# Patient Record
Sex: FEMALE | Race: WHITE | NOT HISPANIC OR LATINO | Employment: UNEMPLOYED | URBAN - METROPOLITAN AREA
[De-identification: names, ages, dates, MRNs, and addresses within clinical notes are randomized per-mention and may not be internally consistent; named-entity substitution may affect disease eponyms.]

---

## 2017-04-21 ENCOUNTER — GENERIC CONVERSION - ENCOUNTER (OUTPATIENT)
Dept: OTHER | Facility: OTHER | Age: 42
End: 2017-04-21

## 2017-09-08 ENCOUNTER — ALLSCRIPTS OFFICE VISIT (OUTPATIENT)
Dept: OTHER | Facility: OTHER | Age: 42
End: 2017-09-08

## 2018-01-12 VITALS
HEART RATE: 60 BPM | BODY MASS INDEX: 22.53 KG/M2 | SYSTOLIC BLOOD PRESSURE: 110 MMHG | WEIGHT: 132 LBS | DIASTOLIC BLOOD PRESSURE: 80 MMHG | HEIGHT: 64 IN | TEMPERATURE: 97.9 F | OXYGEN SATURATION: 99 %

## 2018-02-16 DIAGNOSIS — R21 RASH: Primary | ICD-10-CM

## 2018-02-16 RX ORDER — MOMETASONE FUROATE 1 MG/G
CREAM TOPICAL DAILY
Qty: 45 G | Refills: 0 | Status: SHIPPED | OUTPATIENT
Start: 2018-02-16 | End: 2019-07-31 | Stop reason: SDUPTHER

## 2018-03-27 ENCOUNTER — OFFICE VISIT (OUTPATIENT)
Dept: INTERNAL MEDICINE CLINIC | Facility: CLINIC | Age: 43
End: 2018-03-27

## 2018-03-27 VITALS
WEIGHT: 134 LBS | BODY MASS INDEX: 22.88 KG/M2 | HEART RATE: 72 BPM | DIASTOLIC BLOOD PRESSURE: 72 MMHG | SYSTOLIC BLOOD PRESSURE: 118 MMHG | RESPIRATION RATE: 12 BRPM | HEIGHT: 64 IN

## 2018-03-27 DIAGNOSIS — L98.9 SKIN ABNORMALITY: Primary | ICD-10-CM

## 2018-03-27 DIAGNOSIS — R10.12 LEFT UPPER QUADRANT PAIN: ICD-10-CM

## 2018-03-27 PROCEDURE — 99213 OFFICE O/P EST LOW 20 MIN: CPT | Performed by: INTERNAL MEDICINE

## 2018-03-27 RX ORDER — CLONAZEPAM 0.5 MG/1
1 TABLET ORAL
COMMUNITY
Start: 2016-03-15 | End: 2019-07-31

## 2018-03-27 RX ORDER — OMEPRAZOLE 40 MG/1
40 CAPSULE, DELAYED RELEASE ORAL DAILY
Qty: 30 CAPSULE | Refills: 1 | Status: SHIPPED | OUTPATIENT
Start: 2018-03-27 | End: 2019-07-31

## 2018-03-27 NOTE — ASSESSMENT & PLAN NOTE
Likely a cyst which has been intermittently infected   Will refer to Dr Melissa Nogueira for possible bx and/or excision

## 2018-03-27 NOTE — PROGRESS NOTES
Assessment/Plan:    Skin abnormality  Likely a cyst which has been intermittently infected  Will refer to Dr Ruthy Clark for possible bx and/or excision       Diagnoses and all orders for this visit:    Skin abnormality  -     Ambulatory referral to Dermatology; Future    Left upper quadrant pain  -     Comprehensive metabolic panel; Future  -     CBC and differential; Future  -     US abdomen complete; Future  -     omeprazole (PriLOSEC) 40 MG capsule; Take 1 capsule (40 mg total) by mouth daily    Other orders  -     clonazePAM (KlonoPIN) 0 5 mg tablet; Take 1 tablet by mouth          Subjective:      Patient ID: Rahel Muñoz is a 38 y o  female  Patient presents with complaints of left sided upper abdominal pain  It started 3 days ago, its sharp and lasts a few seconds  Its a 6-7/10 at its worst  Worse with eating but occurs at night in her sleep and wakes her up  It radiates along her ribs to the flank  She has had no urinary symptoms or bleeding  No nausea, vomiting diarrhea or constipation  She has a growth on the right side of her forehead which has been there for 4-6 mths  She had seen the pa who gave her clindamycin and warm compresses but it never went away  It has gotten bigger over time  The following portions of the patient's history were reviewed and updated as appropriate: allergies, current medications, past family history, past medical history, past social history, past surgical history and problem list     Review of Systems   Constitutional: Negative for activity change, appetite change, chills, diaphoresis, fatigue, fever and unexpected weight change  HENT: Negative for congestion, dental problem, drooling, ear discharge, ear pain, facial swelling, hearing loss, mouth sores, nosebleeds, postnasal drip, rhinorrhea, sinus pain, sinus pressure, sneezing, sore throat, tinnitus, trouble swallowing and voice change      Eyes: Negative for photophobia, pain, discharge, redness, itching and visual disturbance  Respiratory: Negative for apnea, cough, choking, chest tightness, shortness of breath, wheezing and stridor  Cardiovascular: Negative for chest pain, palpitations and leg swelling  Gastrointestinal: Positive for abdominal pain  Negative for abdominal distention, anal bleeding, blood in stool, constipation, diarrhea, nausea, rectal pain and vomiting  Endocrine: Negative for cold intolerance, heat intolerance, polydipsia, polyphagia and polyuria  Genitourinary: Negative for decreased urine volume, difficulty urinating, dysuria, enuresis, flank pain, frequency, genital sores, hematuria and urgency  Musculoskeletal: Negative for arthralgias, back pain, gait problem, joint swelling, myalgias, neck pain and neck stiffness  Skin: Negative for color change, pallor, rash and wound  Growth right forehead   Allergic/Immunologic: Negative for environmental allergies, food allergies and immunocompromised state  Neurological: Negative for dizziness, tremors, seizures, syncope, facial asymmetry, speech difficulty, weakness, light-headedness, numbness and headaches  Hematological: Negative for adenopathy  Does not bruise/bleed easily  Psychiatric/Behavioral: Negative for agitation, self-injury, sleep disturbance and suicidal ideas  The patient is not nervous/anxious  Objective:      /72 (BP Location: Left arm, Patient Position: Sitting)   Pulse 72   Resp 12   Ht 5' 4" (1 626 m)   Wt 60 8 kg (134 lb)   BMI 23 00 kg/m²          Physical Exam   Constitutional: She is oriented to person, place, and time  She appears well-developed and well-nourished  No distress  HENT:   Head: Normocephalic and atraumatic  Right Ear: External ear normal    Left Ear: External ear normal    Mouth/Throat: Oropharynx is clear and moist    Eyes: Conjunctivae and EOM are normal  Pupils are equal, round, and reactive to light  No scleral icterus  Neck: Normal range of motion   Neck supple  No JVD present  No tracheal deviation present  No thyromegaly present  Cardiovascular: Normal rate, regular rhythm and intact distal pulses  Exam reveals no gallop and no friction rub  No murmur heard  Pulmonary/Chest: Effort normal and breath sounds normal  No respiratory distress  She has no wheezes  She has no rales  She exhibits no tenderness  Abdominal: Soft  Bowel sounds are normal  She exhibits no distension and no mass  There is tenderness  There is no rebound and no guarding  Tenderness in the left upper quadrant without rebound tenderness   Musculoskeletal: Normal range of motion  She exhibits no edema, tenderness or deformity  Lymphadenopathy:     She has no cervical adenopathy  Neurological: She is alert and oriented to person, place, and time  She has normal reflexes  No cranial nerve deficit  Coordination normal    Skin: Skin is warm and dry  No rash noted  She is not diaphoretic  No erythema  No pallor  Psychiatric: She has a normal mood and affect   Her behavior is normal  Judgment and thought content normal

## 2018-03-28 ENCOUNTER — APPOINTMENT (OUTPATIENT)
Dept: LAB | Facility: HOSPITAL | Age: 43
End: 2018-03-28
Attending: INTERNAL MEDICINE
Payer: COMMERCIAL

## 2018-03-28 ENCOUNTER — HOSPITAL ENCOUNTER (OUTPATIENT)
Dept: ULTRASOUND IMAGING | Facility: HOSPITAL | Age: 43
Discharge: HOME | End: 2018-03-28
Attending: INTERNAL MEDICINE
Payer: COMMERCIAL

## 2018-03-28 DIAGNOSIS — R10.12 LEFT UPPER QUADRANT PAIN: ICD-10-CM

## 2018-03-28 LAB
ALBUMIN SERPL BCP-MCNC: 4 G/DL (ref 3.5–5)
ALP SERPL-CCNC: 53 U/L (ref 46–116)
ALT SERPL W P-5'-P-CCNC: 33 U/L (ref 12–78)
ANION GAP SERPL CALCULATED.3IONS-SCNC: 7 MMOL/L (ref 4–13)
AST SERPL W P-5'-P-CCNC: 20 U/L (ref 5–45)
BASOPHILS # BLD AUTO: 0.03 THOUSANDS/ΜL (ref 0–0.1)
BASOPHILS NFR BLD AUTO: 1 % (ref 0–1)
BILIRUB SERPL-MCNC: 1.1 MG/DL (ref 0.2–1)
BUN SERPL-MCNC: 17 MG/DL (ref 5–25)
CALCIUM SERPL-MCNC: 8.8 MG/DL (ref 8.3–10.1)
CHLORIDE SERPL-SCNC: 105 MMOL/L (ref 100–108)
CO2 SERPL-SCNC: 27 MMOL/L (ref 21–32)
CREAT SERPL-MCNC: 0.76 MG/DL (ref 0.6–1.3)
EOSINOPHIL # BLD AUTO: 0.12 THOUSAND/ΜL (ref 0–0.61)
EOSINOPHIL NFR BLD AUTO: 4 % (ref 0–6)
ERYTHROCYTE [DISTWIDTH] IN BLOOD BY AUTOMATED COUNT: 12.5 % (ref 11.6–15.1)
GFR SERPL CREATININE-BSD FRML MDRD: 98 ML/MIN/1.73SQ M
GLUCOSE P FAST SERPL-MCNC: 85 MG/DL (ref 65–99)
HCT VFR BLD AUTO: 40 % (ref 34.8–46.1)
HGB BLD-MCNC: 13.4 G/DL (ref 11.5–15.4)
LYMPHOCYTES # BLD AUTO: 1.1 THOUSANDS/ΜL (ref 0.6–4.47)
LYMPHOCYTES NFR BLD AUTO: 35 % (ref 14–44)
MCH RBC QN AUTO: 30.8 PG (ref 26.8–34.3)
MCHC RBC AUTO-ENTMCNC: 33.5 G/DL (ref 31.4–37.4)
MCV RBC AUTO: 92 FL (ref 82–98)
MONOCYTES # BLD AUTO: 0.35 THOUSAND/ΜL (ref 0.17–1.22)
MONOCYTES NFR BLD AUTO: 11 % (ref 4–12)
NEUTROPHILS # BLD AUTO: 1.58 THOUSANDS/ΜL (ref 1.85–7.62)
NEUTS SEG NFR BLD AUTO: 50 % (ref 43–75)
NRBC BLD AUTO-RTO: 0 /100 WBCS
PLATELET # BLD AUTO: 152 THOUSANDS/UL (ref 149–390)
PMV BLD AUTO: 9.5 FL (ref 8.9–12.7)
POTASSIUM SERPL-SCNC: 4.2 MMOL/L (ref 3.5–5.3)
PROT SERPL-MCNC: 6.9 G/DL (ref 6.4–8.2)
RBC # BLD AUTO: 4.35 MILLION/UL (ref 3.81–5.12)
SODIUM SERPL-SCNC: 139 MMOL/L (ref 136–145)
WBC # BLD AUTO: 3.19 THOUSAND/UL (ref 4.31–10.16)

## 2018-03-28 PROCEDURE — 80053 COMPREHEN METABOLIC PANEL: CPT

## 2018-03-28 PROCEDURE — 36415 COLL VENOUS BLD VENIPUNCTURE: CPT

## 2018-03-28 PROCEDURE — 76700 US EXAM ABDOM COMPLETE: CPT

## 2018-03-28 PROCEDURE — 85025 COMPLETE CBC W/AUTO DIFF WBC: CPT

## 2018-04-03 ENCOUNTER — TELEPHONE (OUTPATIENT)
Dept: INTERNAL MEDICINE CLINIC | Facility: CLINIC | Age: 43
End: 2018-04-03

## 2018-04-03 NOTE — TELEPHONE ENCOUNTER
Dr Cathy Aguirre pt    Pt can be reached at work p# also- that p# is: [phone]  Pt would like results of her ultra sound that was done last week, Wed 3/28- plus lab work results    Ultra sound and lab work were done at [facility]    I see the results are in the pt's chart      Pls call the pt

## 2018-04-18 DIAGNOSIS — F41.9 ANXIETY: Primary | ICD-10-CM

## 2018-04-18 RX ORDER — ESCITALOPRAM OXALATE 10 MG/1
TABLET ORAL
Qty: 30 TABLET | Refills: 5 | Status: SHIPPED | OUTPATIENT
Start: 2018-04-18 | End: 2018-11-18 | Stop reason: SDUPTHER

## 2018-11-18 DIAGNOSIS — F41.9 ANXIETY: ICD-10-CM

## 2018-11-19 RX ORDER — ESCITALOPRAM OXALATE 10 MG/1
TABLET ORAL
Qty: 30 TABLET | Refills: 5 | Status: SHIPPED | OUTPATIENT
Start: 2018-11-19 | End: 2019-06-14 | Stop reason: SDUPTHER

## 2019-06-07 ENCOUNTER — TELEPHONE (OUTPATIENT)
Dept: INTERNAL MEDICINE CLINIC | Facility: CLINIC | Age: 44
End: 2019-06-07

## 2019-06-13 DIAGNOSIS — F41.9 ANXIETY: ICD-10-CM

## 2019-06-13 RX ORDER — ESCITALOPRAM OXALATE 10 MG/1
TABLET ORAL
Qty: 30 TABLET | Refills: 5 | OUTPATIENT
Start: 2019-06-13

## 2019-06-13 NOTE — TELEPHONE ENCOUNTER
Called and spoke with patient  She made an appointment to establish with Dr Jason Alfred on July 31st  She asked if it would be possible to get a supply to hold her over till the appointment- a month and a half supply? She's been meaning to get an appointment but she just got  and it's "been a whirlwind"  She's excited to see Dr Jason Alfred since her mom sees her and really likes her  Please call the patient either way  She will come in and see a PA if she needs to for a refill if needed       Pharmacy:[facility]    Patient's #[phone]

## 2019-06-14 DIAGNOSIS — F41.9 ANXIETY: ICD-10-CM

## 2019-06-14 RX ORDER — ESCITALOPRAM OXALATE 10 MG/1
10 TABLET ORAL EVERY MORNING
Qty: 90 TABLET | Refills: 2 | Status: SHIPPED | OUTPATIENT
Start: 2019-06-14 | End: 2020-04-05

## 2019-07-31 ENCOUNTER — OFFICE VISIT (OUTPATIENT)
Dept: INTERNAL MEDICINE CLINIC | Facility: CLINIC | Age: 44
End: 2019-07-31
Payer: COMMERCIAL

## 2019-07-31 VITALS
OXYGEN SATURATION: 98 % | SYSTOLIC BLOOD PRESSURE: 112 MMHG | DIASTOLIC BLOOD PRESSURE: 74 MMHG | BODY MASS INDEX: 25.52 KG/M2 | HEART RATE: 64 BPM | HEIGHT: 63 IN | WEIGHT: 144 LBS

## 2019-07-31 DIAGNOSIS — G56.03 CARPAL TUNNEL SYNDROME, BILATERAL: ICD-10-CM

## 2019-07-31 DIAGNOSIS — R53.81 MALAISE AND FATIGUE: ICD-10-CM

## 2019-07-31 DIAGNOSIS — E66.3 OVERWEIGHT (BMI 25.0-29.9): ICD-10-CM

## 2019-07-31 DIAGNOSIS — L20.9 ATOPIC DERMATITIS, UNSPECIFIED TYPE: ICD-10-CM

## 2019-07-31 DIAGNOSIS — R53.83 MALAISE AND FATIGUE: ICD-10-CM

## 2019-07-31 DIAGNOSIS — Z13.6 SCREENING FOR CARDIOVASCULAR CONDITION: ICD-10-CM

## 2019-07-31 DIAGNOSIS — F41.9 ANXIETY: Primary | ICD-10-CM

## 2019-07-31 PROBLEM — R10.12 LEFT UPPER QUADRANT PAIN: Status: RESOLVED | Noted: 2018-03-27 | Resolved: 2019-07-31

## 2019-07-31 PROBLEM — K21.9 GASTROESOPHAGEAL REFLUX DISEASE WITHOUT ESOPHAGITIS: Status: ACTIVE | Noted: 2019-07-31

## 2019-07-31 PROCEDURE — 99214 OFFICE O/P EST MOD 30 MIN: CPT | Performed by: INTERNAL MEDICINE

## 2019-07-31 PROCEDURE — 3008F BODY MASS INDEX DOCD: CPT | Performed by: INTERNAL MEDICINE

## 2019-07-31 PROCEDURE — 1036F TOBACCO NON-USER: CPT | Performed by: INTERNAL MEDICINE

## 2019-07-31 RX ORDER — MOMETASONE FUROATE 1 MG/G
CREAM TOPICAL DAILY
Qty: 45 G | Refills: 2 | Status: SHIPPED | OUTPATIENT
Start: 2019-07-31 | End: 2021-04-19

## 2019-07-31 NOTE — PROGRESS NOTES
INTERNAL MEDICINE OFFICE VISIT  [facility] AT [facility] [place]  [address], [address] [facility]  Tel: [phone]      NAME: Nikko Glasgow  AGE: 41 y o  SEX: female  : 1975   MRN: [id number]    DATE: 2019  TIME: 7:12 PM      Assessment and Plan:  1  Anxiety    Continue Lexapro  - TSH, 3rd generation; Future    2  Atopic dermatitis, unspecified type    - mometasone (ELOCON) 0 1 % cream; Apply topically daily  Dispense: 45 g; Refill: 2    3  Carpal tunnel syndrome, bilateral   she was told to use the wrist splint at night and take ibuprofen as needed  4  Malaise and fatigue    I will get back to her with the results of the blood work  - CBC and differential; Future  - Comprehensive metabolic panel; Future    5  Overweight (BMI 25 0-29  9)  BMI Counseling: Body mass index is 25 51 kg/m²  Discussed the patient's BMI with her  The BMI is above average  BMI counseling and education was provided to the patient  Nutrition recommendations include reducing portion sizes, decreasing overall calorie intake and moderation in carbohydrate intake  6  Screening for cardiovascular condition    - Lipid panel; Future      - Counseling Documentation: patient was counseled regarding: diagnostic results, instructions for management, risk factor reductions, prognosis, patient and family education, impressions, risks and benefits of treatment options and importance of compliance with treatment  - Medication Side Effects: Adverse side effects of medications were reviewed with the patient/guardian today  Return for follow up visit in  1 year or earlier, if needed  Chief Complaint:  Chief Complaint   Patient presents with   [facility]      former [facility] pt  History of Present Illness:    this is Dr Eunice Mcnair patient was here to establish  Anxiety -her symptoms are much better controlled after she was started on Lexapro      Eczema - she has been having a rash on her face which is helped by the mometasone cream     Carpal tunnel syndrome -she has symptoms of pain and numbness in both her hands which is getting worse  Overweight - she was made aware that she needs to lose weight      Active Problem List:  Patient Active Problem List   Diagnosis    Skin abnormality    Anxiety    Atopic dermatitis    Hyperbilirubinemia    Joint pain, hip    Knee pain    Malaise and fatigue    Carpal tunnel syndrome, bilateral    Overweight (BMI 25 0-29  9)    Gastroesophageal reflux disease without esophagitis         Past Medical History:  Past Medical History:   Diagnosis Date    Anxiety          Past Surgical History:  Past Surgical History:   Procedure Laterality Date     SECTION      LAST ASSESSED: [id number]    CYST REMOVAL      HAND EXCISION OF TENDON CYST LAST ASSESSED: [id number]         Family History:  Family History   Problem Relation Age of Onset   Hue Ambar COPD Mother     Hypertension Mother     Diabetes Mother     Kidney cancer Mother    Hue Ambar Breast cancer Mother     Heart attack Mother     Osteoarthritis Mother         GENERALIZED    Hypertension Father         BENIGN ESSENTIAL    COPD Father     Diabetes Father     Kidney cancer Father     Breast cancer Father     Prostate cancer Father     Heart attack Father     Osteoarthritis Father         GENERALIZED    Kidney cancer Maternal Grandfather     Breast cancer Maternal Grandfather     Prostate cancer Maternal Grandfather     Heart attack Maternal Grandfather     Stroke Family         CEREBRAL INFARCTION CVA    Diabetes Paternal Aunt     Kidney cancer Paternal [de-identified]     Breast cancer Paternal Aunt     Heart attack Paternal Aunt     Kidney cancer Other     Breast cancer Other     Prostate cancer Other     Heart attack Other     Kidney cancer Maternal Aunt     Breast cancer Maternal Aunt     Heart attack Maternal Aunt     Kidney cancer Paternal Uncle     Breast cancer Paternal Uncle     Kidney cancer Maternal Uncle     Prostate cancer Maternal Uncle          Social History:  Social History     Socioeconomic History    Marital status: /Civil Union     Spouse name: None    Number of children: None    Years of education: None    Highest education level: None   Occupational History     Comment: FULL-TIME EMPLOYMENT   Social Needs    Financial resource strain: None    Food insecurity:     Worry: None     Inability: None    Transportation needs:     Medical: None     Non-medical: None   Tobacco Use    Smoking status: Never Smoker    Smokeless tobacco: Never Used    Tobacco comment: FORMER SMOKER AS PER [employer]   Substance and Sexual Activity    Alcohol use: Yes     Comment: OCCASIONAL    Drug use: No    Sexual activity: Yes   Lifestyle    Physical activity:     Days per week: 5 days     Minutes per session: 90 min    Stress: Only a little   Relationships    Social connections:     Talks on phone: None     Gets together: None     Attends Hindu service: None     Active member of club or organization: None     Attends meetings of clubs or organizations: None     Relationship status: None    Intimate partner violence:     Fear of current or ex partner: None     Emotionally abused: None     Physically abused: None     Forced sexual activity: None   Other Topics Concern    None   Social History Narrative    None         Allergies:   Allergies   Allergen Reactions    Morphine          Medications:    Current Outpatient Medications:     escitalopram (LEXAPRO) 10 mg tablet, Take 1 tablet (10 mg total) by mouth every morning, Disp: 90 tablet, Rfl: 2    mometasone (ELOCON) 0 1 % cream, Apply topically daily, Disp: 45 g, Rfl: 2      The following portions of the patient's history were reviewed and updated as appropriate: past medical history, past surgical history, family history, social history, allergies, current medications and active problem list       Review of Systems:  Constitutional: Denies fever, chills, weight gain, weight loss,  Has fatigue  Eyes: Denies eye redness, eye discharge, double vision, change in visual acuity  ENT: Denies hearing loss, tinnitus, sneezing, nasal congestion, nasal discharge, sore throat   Respiratory: Denies cough, expectoration, hemoptysis, shortness of breath, wheezing  Cardiovascular: Denies chest pain, palpitations, lower extremity swelling, orthopnea, PND  Gastrointestinal: Denies abdominal pain, heartburn, nausea, vomiting, hematemesis, diarrhea, bloody stools  Genito-Urinary: Denies dysuria, frequency, difficulty in micturition, nocturia, incontinence  Musculoskeletal: Denies back pain, joint pain, muscle pain  Neurologic: Denies confusion, lightheadedness, syncope, headache, focal weakness, sensory changes, seizures  Endocrine: Denies polyuria, polydipsia, temperature intolerance  Allergy and Immunology: Denies hives, insect bite sensitivity  Hematological and Lymphatic: Denies bleeding problems, swollen glands   Psychological: Denies depression, suicidal ideation, anxiety, panic, mood swings  Dermatological: Denies pruritus, rash, skin lesion changes      Vitals:  Vitals:    07/31/19 1806   BP: 112/74   Pulse: 64   SpO2: 98%       Body mass index is 25 51 kg/m²  Weight (last 2 days)     Date/Time   Weight    07/31/19 1806   65 3 (144)                Physical Examination:  General: Patient is not in acute distress  Awake, alert, responding to commands  No weight gain or loss  Head: Normocephalic  Atraumatic  Eyes: Conjunctiva and lids with no swelling, erythema or discharge  Both pupils normal sized, round and reactive to light  Sclera nonicteric  ENT: External examination of nose and ear normal  Otoscopic examination shows translucent tympanic membranes with patent canals without erythema  Oropharynx moist with no erythema, edema, exudate or lesions  Neck: Supple  JVP not raised  Trachea midline  No masses   No thyromegaly  Lungs: No signs of increased work of breathing or respiratory distress  Bilateral bronchovascular breath sounds with no crackles or rhonchi  Chest wall: No tenderness  Cardiovascular: Normal PMI  No thrills  Regular rate and rhythm  S1 and S2 normal  No murmur, rub or gallop  Gastrointestinal: Abdomen soft, nontender  No guarding or rigidity  Liver and spleen not palpable  Bowel sounds present  Neurologic: Cranial nerves II-XII intact  Cortical functions normal  Motor system - Reflexes 2+ and symmetrical  Sensations normal  Musculoskeletal: Gait normal  No joint tenderness  Integumentary: cyst on right forehead  Lymphatic: No palpable lymph nodes in neck, axilla or groin  Extremities: No clubbing, cyanosis, edema or varicosities  Psychological: Judgement and insight normal  Mood and affect normal      Laboratory Results:  CBC with diff:   Lab Results   Component Value Date    WBC 3 19 (L) 03/28/2018    RBC 4 35 03/28/2018    HGB 13 4 03/28/2018    HCT 40 0 03/28/2018    MCV 92 03/28/2018    MCH 30 8 03/28/2018    RDW 12 5 03/28/2018     03/28/2018       CMP:  Lab Results   Component Value Date    CREATININE 0 76 03/28/2018    BUN 17 03/28/2018    K 4 2 03/28/2018     03/28/2018    CO2 27 03/28/2018    ALKPHOS 53 03/28/2018    ALT 33 03/28/2018    AST 20 03/28/2018       No results found for: HGBA1C, MG, PHOS    No results found for: TROPONINI, CKMB, CKTOTAL    Lipid Profile:   No results found for: CHOL  No results found for: HDL  No results found for: LDLCALC  No results found for: TRIG    Imaging Results:  US abdomen complete  Narrative: ABDOMEN ULTRASOUND, COMPLETE     INDICATION:   R10 12: Left upper quadrant pain  COMPARISON:  Portable quadrant ultrasound dated May 22, 2015  TECHNIQUE:   Real-time ultrasound of the abdomen was performed with a curvilinear transducer with both volumetric sweeps and still imaging techniques      FINDINGS:    PANCREAS:  Visualized portions of the pancreas are within normal limits  AORTA AND IVC:  Visualized portions are normal for patient age  LIVER:  Size:  Within normal range  The liver measures 14 8 cm in the midclavicular line  Contour:  Surface contour is smooth  Parenchyma:  Echogenicity and echotexture are within normal limits  No evidence of suspicious mass  Limited imaging of the main portal vein shows it to be patent and hepatopetal     BILIARY:  The gallbladder is normal in caliber  No wall thickening or pericholecystic fluid  No stones or sludge identified  No sonographic Higuera's sign  No intrahepatic biliary dilatation  CBD measures 2 mm  No choledocholithiasis  KIDNEY:   Right kidney measures 10 0 x 5 5 cm  Within normal limits  Left kidney measures 12 5 x 3 3 cm  Within normal limits  SPLEEN:   Measures 13 4 x 13 2 x 5 5 cm  Within normal limits  ASCITES:  None  Impression: No acute intra-abdominal abnormality  No gallstone/sludge, pericholecystic fluid or wall thickening  Mild splenomegaly  No hydronephrosis      Workstation performed: [id number]       Health Maintenance:  Health Maintenance   Topic Date Due    MAMMOGRAM  1975    BMI: Followup Plan  11/07/1995    BMI: Adult  11/07/1995    DTaP,Tdap,and Td Vaccines (1 - Tdap) 11/07/1998    Depression Screening PHQ  03/27/2019    INFLUENZA VACCINE  07/01/2019    PAP SMEAR  04/07/2020    Pneumococcal Vaccine: 65+ Years (1 of 2 - PCV13) 11/07/2042    Pneumococcal Vaccine: Pediatrics (0 to 5 Years) and At-Risk Patients (6 to 59 Years)  Aged Out    HEPATITIS B VACCINES  Aged [employer] History   Administered Date(s) Administered    Tdap 1975         Serafin Nicolas MD  7/31/2019,7:12 PM

## 2020-01-29 ENCOUNTER — LAB (OUTPATIENT)
Dept: LAB | Facility: CLINIC | Age: 45
End: 2020-01-29
Payer: COMMERCIAL

## 2020-01-29 ENCOUNTER — TELEPHONE (OUTPATIENT)
Dept: INTERNAL MEDICINE CLINIC | Facility: CLINIC | Age: 45
End: 2020-01-29

## 2020-01-29 DIAGNOSIS — R53.81 MALAISE AND FATIGUE: ICD-10-CM

## 2020-01-29 DIAGNOSIS — F41.9 ANXIETY: ICD-10-CM

## 2020-01-29 DIAGNOSIS — Z13.6 SCREENING FOR CARDIOVASCULAR CONDITION: ICD-10-CM

## 2020-01-29 DIAGNOSIS — R53.83 MALAISE AND FATIGUE: ICD-10-CM

## 2020-01-29 LAB
ALBUMIN SERPL BCP-MCNC: 4.2 G/DL (ref 3.5–5)
ALP SERPL-CCNC: 61 U/L (ref 46–116)
ALT SERPL W P-5'-P-CCNC: 26 U/L (ref 12–78)
ANION GAP SERPL CALCULATED.3IONS-SCNC: 5 MMOL/L (ref 4–13)
AST SERPL W P-5'-P-CCNC: 16 U/L (ref 5–45)
BASOPHILS # BLD AUTO: 0.04 THOUSANDS/ΜL (ref 0–0.1)
BASOPHILS NFR BLD AUTO: 1 % (ref 0–1)
BILIRUB SERPL-MCNC: 0.83 MG/DL (ref 0.2–1)
BUN SERPL-MCNC: 18 MG/DL (ref 5–25)
CALCIUM SERPL-MCNC: 9.1 MG/DL (ref 8.3–10.1)
CHLORIDE SERPL-SCNC: 106 MMOL/L (ref 100–108)
CHOLEST SERPL-MCNC: 158 MG/DL (ref 50–200)
CO2 SERPL-SCNC: 26 MMOL/L (ref 21–32)
CREAT SERPL-MCNC: 0.78 MG/DL (ref 0.6–1.3)
EOSINOPHIL # BLD AUTO: 0.18 THOUSAND/ΜL (ref 0–0.61)
EOSINOPHIL NFR BLD AUTO: 3 % (ref 0–6)
ERYTHROCYTE [DISTWIDTH] IN BLOOD BY AUTOMATED COUNT: 12.5 % (ref 11.6–15.1)
GFR SERPL CREATININE-BSD FRML MDRD: 94 ML/MIN/1.73SQ M
GLUCOSE SERPL-MCNC: 82 MG/DL (ref 65–140)
HCT VFR BLD AUTO: 40.3 % (ref 34.8–46.1)
HDLC SERPL-MCNC: 77 MG/DL
HGB BLD-MCNC: 13.6 G/DL (ref 11.5–15.4)
IMM GRANULOCYTES # BLD AUTO: 0.01 THOUSAND/UL (ref 0–0.2)
IMM GRANULOCYTES NFR BLD AUTO: 0 % (ref 0–2)
LDLC SERPL CALC-MCNC: 68 MG/DL (ref 0–100)
LYMPHOCYTES # BLD AUTO: 1.71 THOUSANDS/ΜL (ref 0.6–4.47)
LYMPHOCYTES NFR BLD AUTO: 30 % (ref 14–44)
MCH RBC QN AUTO: 31.1 PG (ref 26.8–34.3)
MCHC RBC AUTO-ENTMCNC: 33.7 G/DL (ref 31.4–37.4)
MCV RBC AUTO: 92 FL (ref 82–98)
MONOCYTES # BLD AUTO: 0.4 THOUSAND/ΜL (ref 0.17–1.22)
MONOCYTES NFR BLD AUTO: 7 % (ref 4–12)
NEUTROPHILS # BLD AUTO: 3.28 THOUSANDS/ΜL (ref 1.85–7.62)
NEUTS SEG NFR BLD AUTO: 59 % (ref 43–75)
NONHDLC SERPL-MCNC: 81 MG/DL
NRBC BLD AUTO-RTO: 0 /100 WBCS
PLATELET # BLD AUTO: 206 THOUSANDS/UL (ref 149–390)
PMV BLD AUTO: 9.7 FL (ref 8.9–12.7)
POTASSIUM SERPL-SCNC: 3.8 MMOL/L (ref 3.5–5.3)
PROT SERPL-MCNC: 7.1 G/DL (ref 6.4–8.2)
RBC # BLD AUTO: 4.38 MILLION/UL (ref 3.81–5.12)
SODIUM SERPL-SCNC: 137 MMOL/L (ref 136–145)
TRIGL SERPL-MCNC: 63 MG/DL
TSH SERPL DL<=0.05 MIU/L-ACNC: 1.72 UIU/ML (ref 0.36–3.74)
WBC # BLD AUTO: 5.62 THOUSAND/UL (ref 4.31–10.16)

## 2020-01-29 PROCEDURE — 36415 COLL VENOUS BLD VENIPUNCTURE: CPT

## 2020-01-29 PROCEDURE — 80053 COMPREHEN METABOLIC PANEL: CPT

## 2020-01-29 PROCEDURE — 85025 COMPLETE CBC W/AUTO DIFF WBC: CPT

## 2020-01-29 PROCEDURE — 80061 LIPID PANEL: CPT

## 2020-01-29 PROCEDURE — 84443 ASSAY THYROID STIM HORMONE: CPT

## 2020-01-29 NOTE — TELEPHONE ENCOUNTER
----- Message from Deedee Arreola MD sent at 1/29/2020  4:33 PM EST -----  Labs ok, please call and inform patient

## 2020-02-11 ENCOUNTER — TELEPHONE (OUTPATIENT)
Dept: ADMINISTRATIVE | Facility: OTHER | Age: 45
End: 2020-02-11

## 2020-02-11 ENCOUNTER — OFFICE VISIT (OUTPATIENT)
Dept: INTERNAL MEDICINE CLINIC | Facility: CLINIC | Age: 45
End: 2020-02-11
Payer: COMMERCIAL

## 2020-02-11 VITALS
SYSTOLIC BLOOD PRESSURE: 110 MMHG | OXYGEN SATURATION: 96 % | TEMPERATURE: 98.5 F | HEIGHT: 63 IN | BODY MASS INDEX: 25.51 KG/M2 | HEART RATE: 66 BPM | DIASTOLIC BLOOD PRESSURE: 82 MMHG

## 2020-02-11 DIAGNOSIS — J01.00 ACUTE NON-RECURRENT MAXILLARY SINUSITIS: Primary | ICD-10-CM

## 2020-02-11 PROCEDURE — 99213 OFFICE O/P EST LOW 20 MIN: CPT | Performed by: INTERNAL MEDICINE

## 2020-02-11 PROCEDURE — 1036F TOBACCO NON-USER: CPT | Performed by: INTERNAL MEDICINE

## 2020-02-11 RX ORDER — AMOXICILLIN 875 MG/1
875 TABLET, COATED ORAL 2 TIMES DAILY
Qty: 14 TABLET | Refills: 0 | Status: SHIPPED | OUTPATIENT
Start: 2020-02-11 | End: 2020-02-18

## 2020-02-11 NOTE — PROGRESS NOTES
INTERNAL MEDICINE FOLLOW-UP OFFICE VISIT  [place] [facility] AT [facility]    NAME: Julian Hayes  AGE: 45 y o  SEX: female  : 1975   MRN: [id number]    DATE: 2020  TIME: 1:23 PM    Assessment and Plan     Diagnoses and all orders for this visit:    Acute non-recurrent maxillary sinusitis  -     amoxicillin (AMOXIL) 875 mg tablet; Take 1 tablet (875 mg total) by mouth 2 (two) times a day for 7 days     she was advised to take the Claritin and use the Flonase nasal spray regularly    - Counseling Documentation: patient was counseled regarding: instructions for management, risk factor reductions, prognosis, patient and family education, risks and benefits of treatment options and importance of compliance with treatment  - Medication Side Effects: Adverse side effects of medications were reviewed with the patient/guardian today  Return to office in:  As needed    Chief Complaint     Chief Complaint   Patient presents with    Sinus Problem    Cough       History of Present Illness     Sinusitis   This is a new problem  The current episode started in the past 7 days  The problem is unchanged  There has been no fever  Her pain is at a severity of 3/10  The pain is mild  Associated symptoms include congestion, headaches, sinus pressure and a sore throat  Pertinent negatives include no chills, coughing, diaphoresis, ear pain, neck pain, shortness of breath or sneezing  Past treatments include nothing  The following portions of the patient's history were reviewed and updated as appropriate: allergies, current medications, past family history, past medical history, past social history, past surgical history and problem list     Review of Systems     Review of Systems   Constitutional: Negative for chills, diaphoresis, fatigue and fever  HENT: Positive for congestion, sinus pressure and sore throat   Negative for ear discharge, ear pain, hearing loss, postnasal drip, rhinorrhea, sinus pain, sneezing and voice change  Eyes: Negative for pain, discharge, redness and visual disturbance  Respiratory: Negative for cough, chest tightness, shortness of breath and wheezing  Cardiovascular: Negative for chest pain, palpitations and leg swelling  Gastrointestinal: Negative for abdominal distention, abdominal pain, blood in stool, constipation, diarrhea, nausea and vomiting  Endocrine: Negative for cold intolerance, heat intolerance, polydipsia, polyphagia and polyuria  Genitourinary: Negative for dysuria, flank pain, frequency, hematuria and urgency  Musculoskeletal: Negative for arthralgias, back pain, gait problem, joint swelling, myalgias, neck pain and neck stiffness  Skin: Negative for rash  Neurological: Positive for headaches  Negative for dizziness, tremors, syncope, facial asymmetry, speech difficulty, weakness, light-headedness and numbness  Hematological: Does not bruise/bleed easily  Psychiatric/Behavioral: Negative for behavioral problems, confusion and sleep disturbance  The patient is not nervous/anxious  Active Problem List     Patient Active Problem List   Diagnosis    Skin abnormality    Anxiety    Atopic dermatitis    Hyperbilirubinemia    Joint pain, hip    Knee pain    Malaise and fatigue    Carpal tunnel syndrome, bilateral    Overweight (BMI 25 0-29  9)    Gastroesophageal reflux disease without esophagitis       Objective     /82   Pulse 66   Temp 98 5 °F (36 9 °C)   Ht 5' 3" (1 6 m)   SpO2 96%   BMI 25 51 kg/m²     Physical Exam   Constitutional: She is oriented to person, place, and time  She appears well-developed and well-nourished  No distress  HENT:   Head: Normocephalic and atraumatic  Right Ear: External ear normal    Left Ear: External ear normal    Nose: Nose normal     Throat congested  Maxillary sinus tenderness present   Eyes: Conjunctivae and EOM are normal  Right eye exhibits no discharge   Left eye exhibits no discharge  No scleral icterus  Neck: Normal range of motion  Neck supple  No JVD present  No tracheal deviation present  No thyromegaly present  Cardiovascular: Normal rate, regular rhythm, normal heart sounds and intact distal pulses  Exam reveals no gallop and no friction rub  No murmur heard  Pulmonary/Chest: Effort normal and breath sounds normal  No respiratory distress  She has no wheezes  She has no rales  She exhibits no tenderness  Abdominal: Soft  Bowel sounds are normal  She exhibits no distension  There is no tenderness  There is no rebound and no guarding  Musculoskeletal: Normal range of motion  She exhibits no edema or tenderness  Lymphadenopathy:     She has no cervical adenopathy  Neurological: She is alert and oriented to person, place, and time  No cranial nerve deficit  She exhibits normal muscle tone  Coordination normal    Skin: Skin is warm and dry  No rash noted  She is not diaphoretic  No erythema  Psychiatric: She has a normal mood and affect   Judgment normal            Current Medications       Current Outpatient Medications:     escitalopram (LEXAPRO) 10 mg tablet, Take 1 tablet (10 mg total) by mouth every morning, Disp: 90 tablet, Rfl: 2    mometasone (ELOCON) 0 1 % cream, Apply topically daily, Disp: 45 g, Rfl: 2    amoxicillin (AMOXIL) 875 mg tablet, Take 1 tablet (875 mg total) by mouth 2 (two) times a day for 7 days, Disp: 14 tablet, Rfl: 0    Health Maintenance     Health Maintenance   Topic Date Due    MAMMOGRAM  1975    DTaP,Tdap,and Td Vaccines (1 - Tdap) 11/07/1988    HIV Screening  11/07/1992    Annual Physical  11/07/1995    Influenza Vaccine  07/01/2019    Cervical Cancer Screening  04/07/2020    BMI: Followup Plan  07/31/2020    BMI: Adult  07/31/2020    Depression Screening PHQ  02/11/2021    Pneumococcal Vaccine: 65+ Years (1 of 2 - PCV13) 11/07/2042    Pneumococcal Vaccine: Pediatrics (0 to 5 Years) and At-Risk Patients (6 to 59 Years)  Aged Out    HIB Vaccine  Aged Out    Hepatitis B Vaccine  Aged Out    IPV Vaccine  Aged Out    Hepatitis A Vaccine  Aged Out    Meningococcal ACWY Vaccine  Aged Out    HPV Vaccine  Aged [employer] History   Administered Date(s) Administered    Hep B, adult 07/24/2014, 08/26/2014, 01/28/2015    Tdap 1975         MD Juan Alcala of BEHAVIORAL MEDICINE AT [facility]

## 2020-02-11 NOTE — TELEPHONE ENCOUNTER
----- Message from Long Zuniga sent at 2/11/2020  1:08 PM EST -----  Regarding: Mammo  Contact: [phone]  02/11/20 1:08 PM    Hello, our patient Prashant Betancourt has had Mammogram completed/performed  Please assist in updating the patient chart by pulling the Care Everywhere (CE) document  The date of service is 9/11/19       Thank you,  Jennifer De Luna MA  [facility]  [address]

## 2020-02-11 NOTE — TELEPHONE ENCOUNTER
Upon review of the In Basket request we were able to locate, review, and update the patient chart as requested for Mammogram     Any additional questions or concerns should be emailed to the Practice Liaisons via [email or web address] email, please do not reply via In Basket      Thank you  Koko Campa

## 2020-04-04 DIAGNOSIS — F41.9 ANXIETY: ICD-10-CM

## 2020-04-05 RX ORDER — ESCITALOPRAM OXALATE 10 MG/1
TABLET ORAL
Qty: 90 TABLET | Refills: 2 | Status: SHIPPED | OUTPATIENT
Start: 2020-04-05 | End: 2021-01-21

## 2020-11-11 ENCOUNTER — OFFICE VISIT (OUTPATIENT)
Dept: INTERNAL MEDICINE CLINIC | Facility: CLINIC | Age: 45
End: 2020-11-11
Payer: COMMERCIAL

## 2020-11-11 VITALS
WEIGHT: 154 LBS | DIASTOLIC BLOOD PRESSURE: 70 MMHG | HEART RATE: 72 BPM | BODY MASS INDEX: 27.29 KG/M2 | SYSTOLIC BLOOD PRESSURE: 98 MMHG | HEIGHT: 63 IN | TEMPERATURE: 97.9 F

## 2020-11-11 DIAGNOSIS — M25.511 CHRONIC RIGHT SHOULDER PAIN: Primary | ICD-10-CM

## 2020-11-11 DIAGNOSIS — R42 VERTIGO: ICD-10-CM

## 2020-11-11 DIAGNOSIS — M25.559 HIP PAIN: ICD-10-CM

## 2020-11-11 DIAGNOSIS — G89.29 CHRONIC RIGHT SHOULDER PAIN: Primary | ICD-10-CM

## 2020-11-11 DIAGNOSIS — H66.92 OTITIS OF LEFT EAR: ICD-10-CM

## 2020-11-11 PROCEDURE — 99214 OFFICE O/P EST MOD 30 MIN: CPT | Performed by: INTERNAL MEDICINE

## 2020-11-11 RX ORDER — AMOXICILLIN 875 MG/1
875 TABLET, COATED ORAL 2 TIMES DAILY
Qty: 20 TABLET | Refills: 0 | Status: SHIPPED | OUTPATIENT
Start: 2020-11-11 | End: 2020-11-21

## 2020-11-11 RX ORDER — METHYLPREDNISOLONE 4 MG/1
TABLET ORAL
Qty: 21 EACH | Refills: 0 | Status: SHIPPED | OUTPATIENT
Start: 2020-11-11 | End: 2020-12-07

## 2020-11-11 RX ORDER — MECLIZINE HYDROCHLORIDE 25 MG/1
25 TABLET ORAL 3 TIMES DAILY PRN
Qty: 30 TABLET | Refills: 0 | Status: SHIPPED | OUTPATIENT
Start: 2020-11-11 | End: 2020-12-07

## 2020-11-18 ENCOUNTER — CONSULT (OUTPATIENT)
Dept: OBGYN CLINIC | Facility: CLINIC | Age: 45
End: 2020-11-18
Payer: COMMERCIAL

## 2020-11-18 ENCOUNTER — APPOINTMENT (OUTPATIENT)
Dept: RADIOLOGY | Facility: CLINIC | Age: 45
End: 2020-11-18
Payer: COMMERCIAL

## 2020-11-18 VITALS
DIASTOLIC BLOOD PRESSURE: 76 MMHG | HEART RATE: 62 BPM | TEMPERATURE: 96.7 F | HEIGHT: 63 IN | BODY MASS INDEX: 27.54 KG/M2 | RESPIRATION RATE: 20 BRPM | WEIGHT: 155.4 LBS | SYSTOLIC BLOOD PRESSURE: 116 MMHG

## 2020-11-18 DIAGNOSIS — M54.2 NECK PAIN: Primary | ICD-10-CM

## 2020-11-18 DIAGNOSIS — M75.21 BICEPS TENDINITIS OF RIGHT SHOULDER: ICD-10-CM

## 2020-11-18 DIAGNOSIS — M54.2 NECK PAIN: ICD-10-CM

## 2020-11-18 DIAGNOSIS — M25.511 CHRONIC RIGHT SHOULDER PAIN: ICD-10-CM

## 2020-11-18 DIAGNOSIS — M25.50 ARTHRALGIA, UNSPECIFIED JOINT: ICD-10-CM

## 2020-11-18 DIAGNOSIS — M62.838 CERVICAL PARASPINAL MUSCLE SPASM: ICD-10-CM

## 2020-11-18 DIAGNOSIS — G89.29 CHRONIC RIGHT SHOULDER PAIN: ICD-10-CM

## 2020-11-18 PROCEDURE — 72040 X-RAY EXAM NECK SPINE 2-3 VW: CPT

## 2020-11-18 PROCEDURE — 3008F BODY MASS INDEX DOCD: CPT | Performed by: ORTHOPAEDIC SURGERY

## 2020-11-18 PROCEDURE — 1036F TOBACCO NON-USER: CPT | Performed by: ORTHOPAEDIC SURGERY

## 2020-11-18 PROCEDURE — 99203 OFFICE O/P NEW LOW 30 MIN: CPT | Performed by: ORTHOPAEDIC SURGERY

## 2020-11-18 PROCEDURE — 73030 X-RAY EXAM OF SHOULDER: CPT

## 2020-11-18 RX ORDER — CYCLOBENZAPRINE HCL 10 MG
10 TABLET ORAL
Qty: 12 TABLET | Refills: 0 | Status: SHIPPED | OUTPATIENT
Start: 2020-11-18 | End: 2021-04-19

## 2020-11-19 ENCOUNTER — LAB (OUTPATIENT)
Dept: LAB | Facility: HOSPITAL | Age: 45
End: 2020-11-19
Attending: ORTHOPAEDIC SURGERY
Payer: COMMERCIAL

## 2020-11-19 DIAGNOSIS — M25.50 ARTHRALGIA, UNSPECIFIED JOINT: ICD-10-CM

## 2020-11-19 LAB
BASOPHILS # BLD AUTO: 0.05 THOUSANDS/ΜL (ref 0–0.1)
BASOPHILS NFR BLD AUTO: 1 % (ref 0–1)
CRP SERPL QL: <3 MG/L
EOSINOPHIL # BLD AUTO: 0.17 THOUSAND/ΜL (ref 0–0.61)
EOSINOPHIL NFR BLD AUTO: 4 % (ref 0–6)
ERYTHROCYTE [DISTWIDTH] IN BLOOD BY AUTOMATED COUNT: 12.3 % (ref 11.6–15.1)
ERYTHROCYTE [SEDIMENTATION RATE] IN BLOOD: 1 MM/HOUR (ref 0–19)
HCT VFR BLD AUTO: 39.4 % (ref 34.8–46.1)
HGB BLD-MCNC: 13.3 G/DL (ref 11.5–15.4)
IMM GRANULOCYTES # BLD AUTO: 0.02 THOUSAND/UL (ref 0–0.2)
IMM GRANULOCYTES NFR BLD AUTO: 1 % (ref 0–2)
LYMPHOCYTES # BLD AUTO: 1.44 THOUSANDS/ΜL (ref 0.6–4.47)
LYMPHOCYTES NFR BLD AUTO: 32 % (ref 14–44)
MCH RBC QN AUTO: 31.1 PG (ref 26.8–34.3)
MCHC RBC AUTO-ENTMCNC: 33.8 G/DL (ref 31.4–37.4)
MCV RBC AUTO: 92 FL (ref 82–98)
MONOCYTES # BLD AUTO: 0.36 THOUSAND/ΜL (ref 0.17–1.22)
MONOCYTES NFR BLD AUTO: 8 % (ref 4–12)
NEUTROPHILS # BLD AUTO: 2.4 THOUSANDS/ΜL (ref 1.85–7.62)
NEUTS SEG NFR BLD AUTO: 54 % (ref 43–75)
NRBC BLD AUTO-RTO: 0 /100 WBCS
PLATELET # BLD AUTO: 256 THOUSANDS/UL (ref 149–390)
PMV BLD AUTO: 9.1 FL (ref 8.9–12.7)
RBC # BLD AUTO: 4.28 MILLION/UL (ref 3.81–5.12)
WBC # BLD AUTO: 4.44 THOUSAND/UL (ref 4.31–10.16)

## 2020-11-19 PROCEDURE — 86140 C-REACTIVE PROTEIN: CPT

## 2020-11-19 PROCEDURE — 86618 LYME DISEASE ANTIBODY: CPT

## 2020-11-19 PROCEDURE — 86038 ANTINUCLEAR ANTIBODIES: CPT

## 2020-11-19 PROCEDURE — 85025 COMPLETE CBC W/AUTO DIFF WBC: CPT

## 2020-11-19 PROCEDURE — 85652 RBC SED RATE AUTOMATED: CPT

## 2020-11-19 PROCEDURE — 36415 COLL VENOUS BLD VENIPUNCTURE: CPT

## 2020-11-20 LAB
B BURGDOR IGG+IGM SER-ACNC: 17
RYE IGE QN: NEGATIVE

## 2020-12-07 ENCOUNTER — TELEMEDICINE (OUTPATIENT)
Dept: INTERNAL MEDICINE CLINIC | Facility: CLINIC | Age: 45
End: 2020-12-07
Payer: COMMERCIAL

## 2020-12-07 DIAGNOSIS — J06.9 UPPER RESPIRATORY TRACT INFECTION, UNSPECIFIED TYPE: Primary | ICD-10-CM

## 2020-12-07 PROCEDURE — 99213 OFFICE O/P EST LOW 20 MIN: CPT | Performed by: INTERNAL MEDICINE

## 2020-12-08 PROCEDURE — U0003 INFECTIOUS AGENT DETECTION BY NUCLEIC ACID (DNA OR RNA); SEVERE ACUTE RESPIRATORY SYNDROME CORONAVIRUS 2 (SARS-COV-2) (CORONAVIRUS DISEASE [COVID-19]), AMPLIFIED PROBE TECHNIQUE, MAKING USE OF HIGH THROUGHPUT TECHNOLOGIES AS DESCRIBED BY CMS-2020-01-R: HCPCS | Performed by: INTERNAL MEDICINE

## 2020-12-09 LAB — SARS-COV-2 RNA SPEC QL NAA+PROBE: DETECTED

## 2020-12-10 ENCOUNTER — TELEPHONE (OUTPATIENT)
Dept: INTERNAL MEDICINE CLINIC | Facility: CLINIC | Age: 45
End: 2020-12-10

## 2020-12-16 ENCOUNTER — TELEMEDICINE (OUTPATIENT)
Dept: INTERNAL MEDICINE CLINIC | Facility: CLINIC | Age: 45
End: 2020-12-16
Payer: COMMERCIAL

## 2020-12-16 DIAGNOSIS — B34.2 CORONAVIRUS INFECTION: Primary | ICD-10-CM

## 2020-12-16 DIAGNOSIS — R06.02 SOB (SHORTNESS OF BREATH): ICD-10-CM

## 2020-12-16 PROCEDURE — 1036F TOBACCO NON-USER: CPT | Performed by: INTERNAL MEDICINE

## 2020-12-16 PROCEDURE — 99213 OFFICE O/P EST LOW 20 MIN: CPT | Performed by: INTERNAL MEDICINE

## 2021-01-21 DIAGNOSIS — F41.9 ANXIETY: ICD-10-CM

## 2021-01-21 RX ORDER — ESCITALOPRAM OXALATE 10 MG/1
TABLET ORAL
Qty: 90 TABLET | Refills: 2 | Status: SHIPPED | OUTPATIENT
Start: 2021-01-21 | End: 2021-12-07

## 2021-01-22 ENCOUNTER — TELEPHONE (OUTPATIENT)
Dept: INTERNAL MEDICINE CLINIC | Facility: CLINIC | Age: 46
End: 2021-01-22

## 2021-01-22 DIAGNOSIS — R92.8 ABNORMAL MAMMOGRAM: Primary | ICD-10-CM

## 2021-01-22 NOTE — TELEPHONE ENCOUNTER
[facility]     Pt had a mammogram on 1/14/21, needs to have additional imaging  Needs order for a diagnostic mammogram with possible ultra sound right breast limited       DX [id number]    Appt is scheduled for this Monday     Fax [phone]

## 2021-01-27 DIAGNOSIS — R92.8 ABNORMAL MAMMOGRAM: ICD-10-CM

## 2021-04-05 ENCOUNTER — TELEPHONE (OUTPATIENT)
Dept: INTERNAL MEDICINE CLINIC | Facility: CLINIC | Age: 46
End: 2021-04-05

## 2021-04-05 DIAGNOSIS — B37.9 YEAST INFECTION: Primary | ICD-10-CM

## 2021-04-05 RX ORDER — FLUCONAZOLE 150 MG/1
150 TABLET ORAL ONCE
Qty: 1 TABLET | Refills: 0 | Status: SHIPPED | OUTPATIENT
Start: 2021-04-05 | End: 2021-04-05

## 2021-04-05 NOTE — TELEPHONE ENCOUNTER
----- Message from Whitney Sweeney sent at 4/4/2021  9:19 AM EDT -----  Regarding: Prescription Question  Contact: [phone]  I need a refill of diflucan for yeast infection caused by an antibiotic   Can this be called into [facility] [address] [place] [address]? I have not heard back from my gynecologist    Sri Bryan you

## 2021-04-19 ENCOUNTER — OFFICE VISIT (OUTPATIENT)
Dept: INTERNAL MEDICINE CLINIC | Facility: CLINIC | Age: 46
End: 2021-04-19
Payer: COMMERCIAL

## 2021-04-19 VITALS — BODY MASS INDEX: 27.85 KG/M2 | WEIGHT: 157.2 LBS

## 2021-04-19 DIAGNOSIS — R22.2 CHEST WALL MASS: ICD-10-CM

## 2021-04-19 DIAGNOSIS — R19.00 RIGHT FLANK MASS: ICD-10-CM

## 2021-04-19 DIAGNOSIS — E66.3 OVERWEIGHT (BMI 25.0-29.9): ICD-10-CM

## 2021-04-19 DIAGNOSIS — F41.9 ANXIETY: Primary | ICD-10-CM

## 2021-04-19 PROCEDURE — 1036F TOBACCO NON-USER: CPT | Performed by: PHYSICIAN ASSISTANT

## 2021-04-19 PROCEDURE — 99214 OFFICE O/P EST MOD 30 MIN: CPT | Performed by: PHYSICIAN ASSISTANT

## 2021-04-19 NOTE — PROGRESS NOTES
Addended by: Ricky Anaya on: 4/2/2019 09:31 AM     Modules accepted: Orders Assessment/Plan: episodes of right flank pain and tenderness right flank deep which could be the 10th rib  No mass effect or tenderness on the left  Will get imaging follow-up according to test results       Diagnoses and all orders for this visit:    Anxiety    Overweight (BMI 25 0-29  9)    Chest wall mass  -     XR chest pa & lateral; Future  -     XR ribs 2 vw right; Future  -     CT chest wo contrast; Future    Right flank mass  -     XR chest pa & lateral; Future  -     XR ribs 2 vw right; Future  -     CT chest wo contrast; Future        No problem-specific Assessment & Plan notes found for this encounter  BMI Counseling: Body mass index is 27 85 kg/m²  The BMI is above normal  Nutrition recommendations include decreasing portion sizes  Exercise recommendations include exercising 3-5 times per week  Subjective:      Patient ID: Gwen Mcleod is a 39 y o  female  She noticed episodes of pain on a spot chest below her right ribcage anterior axillary line a week ago  She has episodes of pinching pain that last a minute or so that occur at random  She has tenderness when she pushes in just below her right lateral ribcage and she noticed some pain 2 days ago when she was vacuuming  Overall he has episodes occur at random  She is unable to reproduce the pain currently but it is tender when she presses on the spot  She is otherwise well healthy active ambulatory  No chest pain shortness of breath coughing fever chills  History of COVID completely recovered  History of depression well controlled with meds      The following portions of the patient's history were reviewed and updated as appropriate:   She has a past medical history of Anxiety  ,  does not have any pertinent problems on file  ,   has a past surgical history that includes  section and Cyst Removal ,  family history includes Breast cancer in her maternal aunt, maternal grandfather, mother, other, paternal aunt, and paternal uncle; COPD in her mother; Diabetes in her paternal aunt; Heart attack in her maternal aunt, maternal grandfather, other, and paternal aunt; Heart disease in her father; Hypertension in her father and mother; Kidney cancer in her maternal grandfather, other, and paternal uncle; Kidney disease in her mother; Osteoarthritis in her father and mother; Prostate cancer in her maternal grandfather, maternal uncle, and other; Stroke in her family  ,   reports that she has quit smoking  She has never used smokeless tobacco  She reports current alcohol use  She reports that she does not use drugs  ,  is allergic to morphine     Current Outpatient Medications   Medication Sig Dispense Refill    escitalopram (LEXAPRO) 10 mg tablet take 1 tablet by mouth every morning 90 tablet 2     No current facility-administered medications for this visit  Review of Systems   Constitutional: Negative for chills and fever  HENT: Negative for ear pain and sore throat  Eyes: Negative for pain and visual disturbance  Respiratory: Negative for cough and shortness of breath  Cardiovascular: Positive for chest pain  Negative for palpitations  Gastrointestinal: Negative for abdominal pain and vomiting  Genitourinary: Negative for dysuria and hematuria  Musculoskeletal: Negative for arthralgias and back pain  Skin: Negative for color change and rash  Neurological: Negative for seizures and syncope  All other systems reviewed and are negative  Objective:  Vitals:    04/19/21 1642   Weight: 71 3 kg (157 lb 3 2 oz)     Body mass index is 27 85 kg/m²  Physical Exam  Vitals signs reviewed  Constitutional:       Appearance: She is well-developed  She is obese  HENT:      Head: Normocephalic        Right Ear: Tympanic membrane and external ear normal       Left Ear: Tympanic membrane and external ear normal       Nose: Nose normal       Mouth/Throat:      Mouth: Mucous membranes are moist    Eyes: Extraocular Movements: Extraocular movements intact  Conjunctiva/sclera: Conjunctivae normal       Pupils: Pupils are equal, round, and reactive to light  Neck:      Musculoskeletal: Normal range of motion and neck supple  Thyroid: No thyromegaly  Cardiovascular:      Rate and Rhythm: Normal rate and regular rhythm  Pulses: Normal pulses  Heart sounds: Normal heart sounds  Pulmonary:      Effort: Pulmonary effort is normal  No respiratory distress  Breath sounds: Normal breath sounds  No wheezing or rhonchi  Abdominal:      General: Abdomen is flat  Bowel sounds are normal  There is no distension  Palpations: Abdomen is soft  Tenderness: There is no abdominal tenderness  There is no right CVA tenderness or left CVA tenderness  Musculoskeletal: Normal range of motion  General: Tenderness ( tenderness deep right flank in the region of the 10th rib mass effect consistent with the 10th rib) present  No swelling, deformity or signs of injury  Skin:     General: Skin is warm and dry  Neurological:      General: No focal deficit present  Mental Status: She is alert and oriented to person, place, and time  Deep Tendon Reflexes: Reflexes are normal and symmetric  Psychiatric:         Mood and Affect: Mood normal          Thought Content:  Thought content normal          Judgment: Judgment normal

## 2021-04-20 ENCOUNTER — APPOINTMENT (OUTPATIENT)
Dept: RADIOLOGY | Facility: CLINIC | Age: 46
End: 2021-04-20
Payer: COMMERCIAL

## 2021-04-20 DIAGNOSIS — R22.2 CHEST WALL MASS: ICD-10-CM

## 2021-04-20 DIAGNOSIS — R19.00 RIGHT FLANK MASS: ICD-10-CM

## 2021-04-20 PROCEDURE — 71100 X-RAY EXAM RIBS UNI 2 VIEWS: CPT

## 2021-04-20 PROCEDURE — 71046 X-RAY EXAM CHEST 2 VIEWS: CPT

## 2021-04-23 ENCOUNTER — TELEPHONE (OUTPATIENT)
Dept: INTERNAL MEDICINE CLINIC | Facility: CLINIC | Age: 46
End: 2021-04-23

## 2021-04-23 NOTE — TELEPHONE ENCOUNTER
Pt saw her rib x ray result in her chart    and she needs clarification on the result    she's not sure what it means  Will Zhang would like a call back regarding this

## 2021-04-30 ENCOUNTER — HOSPITAL ENCOUNTER (OUTPATIENT)
Dept: CT IMAGING | Facility: CLINIC | Age: 46
Discharge: HOME | End: 2021-04-30
Payer: COMMERCIAL

## 2021-04-30 DIAGNOSIS — R22.2 CHEST WALL MASS: ICD-10-CM

## 2021-04-30 DIAGNOSIS — R19.00 RIGHT FLANK MASS: ICD-10-CM

## 2021-04-30 PROCEDURE — G1004 CDSM NDSC: HCPCS

## 2021-04-30 PROCEDURE — 71250 CT THORAX DX C-: CPT

## 2021-05-03 ENCOUNTER — TELEPHONE (OUTPATIENT)
Dept: INTERNAL MEDICINE CLINIC | Facility: CLINIC | Age: 46
End: 2021-05-03

## 2021-05-04 ENCOUNTER — TELEPHONE (OUTPATIENT)
Dept: OBGYN CLINIC | Facility: OTHER | Age: 46
End: 2021-05-04

## 2021-05-04 NOTE — TELEPHONE ENCOUNTER
Patient called in , stating she woke up with neck pain yesterday and has not gotten any better  I did schedule a follow up on 05-11 , Zen Banda is that okay? She just wanted to be seen ASAP and that was the soonest     Also , Dr Randal Bolanos , she wants to know if there is any medication that could be prescribed / recommended? She is taking 200mg Ibuprofen she took for this morning and noting has not helped  [address] [facility] in [facility] [place]      C/b # [phone] , can leave amessage

## 2021-05-04 NOTE — TELEPHONE ENCOUNTER
I spoke with patient and she states she felt like she slept wrong and her neck started with pain again, seems worse then it did before  Patient did not follow up with PT due to being Covid positive after office visit in November  She was given an RX for Flexeril and stated that this helped  Please advise if you can send a refill to pharmacy on file while she waits for 5/11 appt  In the meantime advised to heat/ice 20 min on 20 min off   , tylenol 1000mg TID PRN, ibuprofen 800mg TID with meals PRN if able to take these OTC medications  Call with  worsening of symptoms  Patient verbalized understanding

## 2021-05-04 NOTE — TELEPHONE ENCOUNTER
Patient is still having r flank pain    radiologist stated that she was complaining of abdominal pain  Do you want to order anything to do with the abdomen    she is still in pain

## 2021-05-04 NOTE — TELEPHONE ENCOUNTER
Call to this patient made her aware that since she has not been seen since Nov 2021 Doctor will not order the flexeril until she is seen she understood

## 2021-05-04 NOTE — TELEPHONE ENCOUNTER
No lesion on the chest CT seen    The radiologist said that pain was in the abdomen, if that is true she needs to be evaluated again in the office

## 2021-05-05 ENCOUNTER — APPOINTMENT (OUTPATIENT)
Dept: LAB | Facility: CLINIC | Age: 46
End: 2021-05-05
Payer: COMMERCIAL

## 2021-05-05 ENCOUNTER — OFFICE VISIT (OUTPATIENT)
Dept: INTERNAL MEDICINE CLINIC | Facility: CLINIC | Age: 46
End: 2021-05-05
Payer: COMMERCIAL

## 2021-05-05 VITALS
OXYGEN SATURATION: 97 % | BODY MASS INDEX: 27.14 KG/M2 | TEMPERATURE: 98.8 F | HEART RATE: 80 BPM | DIASTOLIC BLOOD PRESSURE: 64 MMHG | WEIGHT: 153.2 LBS | SYSTOLIC BLOOD PRESSURE: 118 MMHG | HEIGHT: 63 IN

## 2021-05-05 DIAGNOSIS — R10.9 ACUTE RIGHT FLANK PAIN: Primary | ICD-10-CM

## 2021-05-05 DIAGNOSIS — R10.9 ACUTE RIGHT FLANK PAIN: ICD-10-CM

## 2021-05-05 LAB
BACTERIA UR QL AUTO: NORMAL /HPF
BILIRUB UR QL STRIP: NEGATIVE
CLARITY UR: CLEAR
COLOR UR: YELLOW
GLUCOSE UR STRIP-MCNC: NEGATIVE MG/DL
HGB UR QL STRIP.AUTO: NEGATIVE
HYALINE CASTS #/AREA URNS LPF: NORMAL /LPF
KETONES UR STRIP-MCNC: NEGATIVE MG/DL
LEUKOCYTE ESTERASE UR QL STRIP: NEGATIVE
NITRITE UR QL STRIP: NEGATIVE
NON-SQ EPI CELLS URNS QL MICRO: NORMAL /HPF
PH UR STRIP.AUTO: 6.5 [PH]
PROT UR STRIP-MCNC: NEGATIVE MG/DL
RBC #/AREA URNS AUTO: NORMAL /HPF
SP GR UR STRIP.AUTO: 1.01 (ref 1–1.03)
UROBILINOGEN UR QL STRIP.AUTO: 1 E.U./DL
WBC #/AREA URNS AUTO: NORMAL /HPF

## 2021-05-05 PROCEDURE — 81001 URINALYSIS AUTO W/SCOPE: CPT | Performed by: INTERNAL MEDICINE

## 2021-05-05 PROCEDURE — 87086 URINE CULTURE/COLONY COUNT: CPT

## 2021-05-05 PROCEDURE — 99213 OFFICE O/P EST LOW 20 MIN: CPT | Performed by: INTERNAL MEDICINE

## 2021-05-05 RX ORDER — DICYCLOMINE HCL 20 MG
20 TABLET ORAL EVERY 6 HOURS
Qty: 30 TABLET | Refills: 0 | Status: SHIPPED | OUTPATIENT
Start: 2021-05-05 | End: 2022-02-03

## 2021-05-05 NOTE — PROGRESS NOTES
INTERNAL MEDICINE FOLLOW-UP OFFICE VISIT  [facility] AT [facility]    NAME: Mike Frost  AGE: 39 y o  SEX: female  : 1975   MRN: [id number]    DATE: 2021  TIME: 11:05 AM    Assessment and Plan     Diagnoses and all orders for this visit:    Acute right flank pain  -     Urinalysis with microscopic  -     Urine culture; Future  -     US abdomen limited; Future  -     dicyclomine (BENTYL) 20 mg tablet; Take 1 tablet (20 mg total) by mouth every 6 (six) hours        - Counseling Documentation: patient was counseled regarding: diagnostic results, instructions for management, risk factor reductions, prognosis, patient and family education, risks and benefits of treatment options and importance of compliance with treatment  - Medication Side Effects: Adverse side effects of medications were reviewed with the patient/guardian today  Return to office in:  As needed    Chief Complaint     Chief Complaint   Patient presents with    Abdominal Pain     Patient has been having pain on the right side of her stomach, last time she was here Blondell Finger ordered a cat scan and nothing was found       History of Present Illness     HPI   she was seen for the same symptoms about 3 weeks ago and CT of the chest and x-ray of the ribs was ordered which was both within normal limits  Patient continues to have pain in the right flank and right upper abdomen  It does not seem to be related to food and patient  denies any urinary symptoms    The following portions of the patient's history were reviewed and updated as appropriate: allergies, current medications, past family history, past medical history, past social history, past surgical history and problem list     Review of Systems     Review of Systems   Constitutional: Negative for chills, diaphoresis, fatigue and fever     HENT: Negative for congestion, ear discharge, ear pain, hearing loss, postnasal drip, rhinorrhea, sinus pressure, sinus pain, sneezing, sore throat and voice change  Eyes: Negative for pain, discharge, redness and visual disturbance  Respiratory: Negative for cough, chest tightness, shortness of breath and wheezing  Cardiovascular: Negative for chest pain, palpitations and leg swelling  Gastrointestinal: Positive for abdominal pain  Negative for abdominal distention, blood in stool, constipation, diarrhea, nausea and vomiting  Endocrine: Negative for cold intolerance, heat intolerance, polydipsia, polyphagia and polyuria  Genitourinary: Positive for flank pain  Negative for dysuria, frequency, hematuria and urgency  Musculoskeletal: Negative for arthralgias, back pain, gait problem, joint swelling, myalgias, neck pain and neck stiffness  Skin: Negative for rash  Neurological: Negative for dizziness, tremors, syncope, facial asymmetry, speech difficulty, weakness, light-headedness, numbness and headaches  Hematological: Does not bruise/bleed easily  Psychiatric/Behavioral: Negative for behavioral problems, confusion and sleep disturbance  The patient is not nervous/anxious  Active Problem List     Patient Active Problem List   Diagnosis    Skin abnormality    Anxiety    Atopic dermatitis    Hyperbilirubinemia    Joint pain, hip    Knee pain    Malaise and fatigue    Carpal tunnel syndrome, bilateral    Overweight (BMI 25 0-29  9)    Gastroesophageal reflux disease without esophagitis    Coronavirus infection    SOB (shortness of breath)       Objective     /64 (BP Location: Left arm, Patient Position: Sitting, Cuff Size: Standard)   Pulse 80   Temp 98 8 °F (37 1 °C) (Temporal) Comment: NO NSAIDS  Ht 5' 3" (1 6 m)   Wt 69 5 kg (153 lb 3 2 oz)   SpO2 97%   BMI 27 14 kg/m²     Physical Exam  Constitutional:       General: She is not in acute distress  Appearance: She is well-developed  She is not diaphoretic  HENT:      Head: Normocephalic and atraumatic        Right Ear: External ear normal       Left Ear: External ear normal       Nose: Nose normal    Eyes:      General: No scleral icterus  Right eye: No discharge  Left eye: No discharge  Conjunctiva/sclera: Conjunctivae normal    Neck:      Musculoskeletal: Normal range of motion and neck supple  Thyroid: No thyromegaly  Vascular: No JVD  Trachea: No tracheal deviation  Cardiovascular:      Rate and Rhythm: Normal rate and regular rhythm  Heart sounds: Normal heart sounds  No murmur  No friction rub  No gallop  Pulmonary:      Effort: Pulmonary effort is normal  No respiratory distress  Breath sounds: Normal breath sounds  No wheezing or rales  Chest:      Chest wall: No tenderness  Abdominal:      General: Bowel sounds are normal  There is no distension  Palpations: Abdomen is soft  Tenderness: There is abdominal tenderness  There is right CVA tenderness  There is no guarding or rebound  Musculoskeletal: Normal range of motion  General: No tenderness  Lymphadenopathy:      Cervical: No cervical adenopathy  Skin:     General: Skin is warm and dry  Findings: No erythema or rash  Neurological:      Mental Status: She is alert and oriented to person, place, and time  Cranial Nerves: No cranial nerve deficit  Motor: No abnormal muscle tone        Coordination: Coordination normal    Psychiatric:         Judgment: Judgment normal            Current Medications       Current Outpatient Medications:     escitalopram (LEXAPRO) 10 mg tablet, take 1 tablet by mouth every morning, Disp: 90 tablet, Rfl: 2    dicyclomine (BENTYL) 20 mg tablet, Take 1 tablet (20 mg total) by mouth every 6 (six) hours, Disp: 30 tablet, Rfl: 0    Health Maintenance     Health Maintenance   Topic Date Due    HIV Screening  Never done    COVID-19 Vaccine (1) Never done    Annual Physical  Never done    Cervical Cancer Screening  04/07/2020    Depression Screening PHQ  02/11/2021  MAMMOGRAM  01/25/2022    BMI: Followup Plan  04/19/2022    BMI: Adult  05/05/2022    DTaP,Tdap,and Td Vaccines (2 - Td) 01/09/2031    Influenza Vaccine  Completed    Pneumococcal Vaccine: Pediatrics (0 to 5 Years) and At-Risk Patients (6 to 59 Years)  Aged Out    HIB Vaccine  Aged Out    Hepatitis B Vaccine  Aged Out    IPV Vaccine  Aged Out    Hepatitis A Vaccine  Aged Out    Meningococcal ACWY Vaccine  Aged Out    HPV Vaccine  Aged [employer] History   Administered Date(s) Administered    Hep B, adult 07/24/2014, 08/26/2014, 01/28/2015    INFLUENZA 01/09/2021    Influenza, recombinant, quadrivalent,injectable, preservative free 01/09/2021    Tdap 1975, 01/09/2021         Yamilet Aguayo MD  [address] [facility]IC[facility] AT [facility]

## 2021-05-06 ENCOUNTER — TELEPHONE (OUTPATIENT)
Dept: INTERNAL MEDICINE CLINIC | Facility: CLINIC | Age: 46
End: 2021-05-06

## 2021-05-06 LAB — BACTERIA UR CULT: NORMAL

## 2021-05-06 NOTE — TELEPHONE ENCOUNTER
----- Message from Khadijah Rubalcava MD sent at 5/6/2021 11:47 AM EDT -----  No UTI on urine culture

## 2021-05-11 ENCOUNTER — OFFICE VISIT (OUTPATIENT)
Dept: OBGYN CLINIC | Facility: CLINIC | Age: 46
End: 2021-05-11
Payer: COMMERCIAL

## 2021-05-11 VITALS
WEIGHT: 153 LBS | BODY MASS INDEX: 27.11 KG/M2 | HEART RATE: 74 BPM | SYSTOLIC BLOOD PRESSURE: 104 MMHG | RESPIRATION RATE: 20 BRPM | DIASTOLIC BLOOD PRESSURE: 65 MMHG | HEIGHT: 63 IN

## 2021-05-11 DIAGNOSIS — M62.838 CERVICAL PARASPINAL MUSCLE SPASM: ICD-10-CM

## 2021-05-11 DIAGNOSIS — M54.12 CERVICAL RADICULITIS: ICD-10-CM

## 2021-05-11 DIAGNOSIS — M75.21 BICEPS TENDINITIS OF RIGHT SHOULDER: Primary | ICD-10-CM

## 2021-05-11 PROCEDURE — 99213 OFFICE O/P EST LOW 20 MIN: CPT | Performed by: ORTHOPAEDIC SURGERY

## 2021-05-11 PROCEDURE — 1036F TOBACCO NON-USER: CPT | Performed by: ORTHOPAEDIC SURGERY

## 2021-05-11 PROCEDURE — 3008F BODY MASS INDEX DOCD: CPT | Performed by: ORTHOPAEDIC SURGERY

## 2021-05-11 RX ORDER — METHYLPREDNISOLONE 4 MG/1
TABLET ORAL
Qty: 1 EACH | Refills: 0 | Status: SHIPPED | OUTPATIENT
Start: 2021-05-11 | End: 2022-02-03

## 2021-05-11 NOTE — PROGRESS NOTES
Patient Name:  Whitney Sweeney  MRN:  [id number]    Assessment & Plan     1  Biceps tendinitis of right shoulder    2  Cervical paraspinal muscle spasm      82-year-old female with cervical radiculitis and paraspinal muscle spasm  X-rays of cervical spine discussed while in office  In-depth discussion had with patient regarding continued care of neck pain  At this time, in light of patient's cervical radiculitis, will prescribe Medrol Dosepak and order MRI cervical spine  Referral will be placed in chart for Dr Leena Huff for continued evaluation and interpretation of MRI  In regards to left greater trochanteric bursitis, advised patient will hold off on administering any corticosteroid injection secondary to oral steroid administration  Advised patient physical therapy can work on low back strength, core strengthening, bilateral lower extremity strengthening and stretching with modalities as needed  Will hold off on any physical therapy at this time until after MRI and appointment with Dr Leena Huff for continued evaluation  Patient will follow up in office as needed if right shoulder pain continues, worsens  Patient verbalized understanding of the above  History of the Present Illness   Whitney Sweeney is a 39 y o  female with history of neck pain, proximal biceps tendonitis  At last appointment on 11/18/2020 patient was prescribed formal outpatient physical therapy or right shoulder range of motion, strengthening, rotator cuff band work, in addition to cervical spine range of motion, traction  She was also prescribed Flexeril at that time for cervical paraspinal spasms  Patient was lost to follow-up 4-6 weeks after 1st appointment  Today, the patient reports she was unable to follow-up for appointment because she test positive for COVID and was unable to participate in physical therapy  The patient reports approximately 2 weeks ago she felt as if she slept wrong and had moderate neck stiffness and pain  She admits pain radiates to right and left upper trapezius and throughout bilateral upper extremities  She notices decreased range of motion of her cervical spine  She has been taking a 3-4 tablets of ibuprofen 200 mg as needed throughout her day  She reports right shoulder is feeling okay but notes pain posterior aspect especially with overhead motions  The patient believes right shoulder pain is associated with neck pain  She is currently not working  She also briefly mentions pain at lateral aspect of left hip with radiation throughout entire left lower extremity  She was diagnosed with greater trochanteric bursitis many years ago and was given Medrol Dosepak at that time for pain relief  Patient denies saddle anesthesia, bowel or bladder incontinence  Review of Systems     Review of Systems   Constitutional: Negative for chills and fever  HENT: Negative for congestion  Respiratory: Negative for cough, chest tightness and shortness of breath  Cardiovascular: Negative for chest pain and palpitations  Gastrointestinal: Negative for abdominal pain  Endocrine: Negative for cold intolerance and heat intolerance  Musculoskeletal: Negative for back pain, gait problem and joint swelling  Neurological: Negative for syncope  Psychiatric/Behavioral: Negative for confusion  Physical Exam     /65   Pulse 74   Resp 20   Ht 5' 3" (1 6 m)   Wt 69 4 kg (153 lb)   BMI 27 10 kg/m²     Left Shoulder: Active range of motion to 170 degrees forward flexion, 170 degrees abduction with posterior shoulder pain, 90 degrees external rotation  There is mild tenderness present over the proximal biceps tendon, bilateral upper trapezius, rhomboids with associated numbness  Empty can testing is negative with posterior shoulder pain  There is 5/5 strength with external rotation testing at the side  Belly press test is negative    La test is equivocal   Bandera's test is equivocal   Speed's test is negative  The scapula is depressed 1cm and protracted  The patient is neurovascularly intact distally in the extremity  Cervical Spine: Active range of motion is limited in all planes, especially lateral rotation and lateral flexion with midline and upper trapezius pain  There is midline tenderness cervical spine and upper thoracic spine  There is paraspinal hypertonicity and tenderness  Sensation intact to light touch bilateral C5 through T1 dermatomes left upper extremity  Sensation intact to light touch bilateral C5 through T1 dermatomes right upper extremity  5/5 motor strength left biceps, triceps, wrist extension, finger flexion, finger abduction  5/5 motor strength right biceps, triceps, wrist extension, finger flexion, finger abduction  Spurling test is equivocal with midline pain  negative Chapman's sign  Lumbar spine: There is  No midline tenderness present  There is  minimal paraspinal tenderness  Point tenderness over left greater trochanteric bursa  Sensation  intact to light touch left L2 through S1 dermatomes  Sensation  intact to light touch right L2 through S1 dermatomes 5/5 strength left iliopsoas, 4/5 quadriceps, 5/5 tibialis anterior, extensor hallucis longus, and gastrocsoleus  5/5 strength right iliopsoas, quadriceps, tibialis anterior, extensor hallucis longus, and gastrocsoleus  Negative clonus bilaterally  Negative Babinski bilaterally  Straight leg raise test is equivocal   The patient is well perfused distally  Data Review     I have personally reviewed pertinent films in PACS, and my interpretation follows  No new images needed in office today  Cervical spine images performed in November demonstrate straightening of anatomical cervical spine lordosis, degenerative changes noted at C6-C7      Social History     Tobacco Use    Smoking status: Former Smoker    Smokeless tobacco: Never Used    Tobacco comment: FORMER SMOKER AS PER [employer]   Substance Use Topics    Alcohol use: Yes     Comment: OCCASIONAL    Drug use: No           Procedures   None      Katherine Vazquez PA-C

## 2021-12-07 DIAGNOSIS — F41.9 ANXIETY: ICD-10-CM

## 2021-12-07 RX ORDER — ESCITALOPRAM OXALATE 10 MG/1
TABLET ORAL
Qty: 90 TABLET | Refills: 0 | Status: SHIPPED | OUTPATIENT
Start: 2021-12-07 | End: 2022-03-14

## 2022-02-03 ENCOUNTER — OFFICE VISIT (OUTPATIENT)
Dept: INTERNAL MEDICINE CLINIC | Facility: CLINIC | Age: 47
End: 2022-02-03
Payer: COMMERCIAL

## 2022-02-03 VITALS
SYSTOLIC BLOOD PRESSURE: 120 MMHG | HEART RATE: 65 BPM | DIASTOLIC BLOOD PRESSURE: 84 MMHG | TEMPERATURE: 98.5 F | OXYGEN SATURATION: 99 % | HEIGHT: 63 IN | BODY MASS INDEX: 28.84 KG/M2 | WEIGHT: 162.8 LBS

## 2022-02-03 DIAGNOSIS — H92.02 LEFT EAR PAIN: Primary | ICD-10-CM

## 2022-02-03 DIAGNOSIS — J01.00 SUBACUTE MAXILLARY SINUSITIS: ICD-10-CM

## 2022-02-03 DIAGNOSIS — R51.9 FACIAL PAIN: ICD-10-CM

## 2022-02-03 PROCEDURE — 1036F TOBACCO NON-USER: CPT | Performed by: INTERNAL MEDICINE

## 2022-02-03 PROCEDURE — 99213 OFFICE O/P EST LOW 20 MIN: CPT | Performed by: INTERNAL MEDICINE

## 2022-02-03 PROCEDURE — 3008F BODY MASS INDEX DOCD: CPT | Performed by: INTERNAL MEDICINE

## 2022-02-03 NOTE — PROGRESS NOTES
INTERNAL MEDICINE FOLLOW-UP OFFICE VISIT  [facility] of BEHAVIORAL MEDICINE AT [facility]    NAME: Magdalena Klinefelter  AGE: 42 y o  SEX: female  : 1975   MRN: [id number]    DATE: 2/3/2022  TIME: 1:47 PM    Assessment and Plan     Diagnoses and all orders for this visit:    Left ear pain  -     Ambulatory Referral to Otolaryngology; Future  -     CBC and differential; Future  -     Comprehensive metabolic panel; Future    Subacute maxillary sinusitis  -     CT sinus wo contrast; Future  -     Ambulatory Referral to Otolaryngology; Future    Facial pain  -     Sedimentation rate, automated; Future  -     C-reactive protein; Future          - Counseling Documentation: patient was counseled regarding: instructions for management, risk factor reductions, prognosis, patient and family education, risks and benefits of treatment options and importance of compliance with treatment  - Medication Side Effects: Adverse side effects of medications were reviewed with the patient/guardian today  Return to office in: as needed    Chief Complaint     Chief Complaint   Patient presents with    Earache     face pain  sinus issues  History of Present Illness     HPI   she has been having pain in the ear an in the head just above the ear for the last few weeks  It comes on every 10-14 days  The pain in the head is very severe  There is no discharge from the ear  She denies any fever or chills  She does have some congestion and sinus tenderness on the left side  The following portions of the patient's history were reviewed and updated as appropriate: allergies, current medications, past family history, past medical history, past social history, past surgical history and problem list     Review of Systems     Review of Systems   Constitutional: Negative for chills, diaphoresis, fatigue and fever  HENT: Positive for congestion, ear pain, sinus pressure and sinus pain   Negative for ear discharge, hearing loss, postnasal drip, rhinorrhea, sneezing, sore throat and voice change  Eyes: Negative for pain, discharge, redness and visual disturbance  Respiratory: Negative for cough, chest tightness, shortness of breath and wheezing  Cardiovascular: Negative for chest pain, palpitations and leg swelling  Gastrointestinal: Negative for abdominal distention, abdominal pain, blood in stool, constipation, diarrhea, nausea and vomiting  Endocrine: Negative for cold intolerance, heat intolerance, polydipsia, polyphagia and polyuria  Genitourinary: Negative for dysuria, flank pain, frequency, hematuria and urgency  Musculoskeletal: Negative for arthralgias, back pain, gait problem, joint swelling, myalgias, neck pain and neck stiffness  Skin: Negative for rash  Neurological: Positive for headaches  Negative for dizziness, tremors, syncope, facial asymmetry, speech difficulty, weakness, light-headedness and numbness  Hematological: Does not bruise/bleed easily  Psychiatric/Behavioral: Negative for behavioral problems, confusion and sleep disturbance  The patient is not nervous/anxious  Active Problem List     Patient Active Problem List   Diagnosis    Skin abnormality    Anxiety    Atopic dermatitis    Hyperbilirubinemia    Joint pain, hip    Knee pain    Malaise and fatigue    Carpal tunnel syndrome, bilateral    Overweight (BMI 25 0-29  9)    Gastroesophageal reflux disease without esophagitis    Coronavirus infection    SOB (shortness of breath)       Objective     /84 (BP Location: Left arm, Patient Position: Sitting, Cuff Size: Standard)   Pulse 65   Temp 98 5 °F (36 9 °C) (Tympanic)   Ht 5' 3" (1 6 m)   Wt 73 8 kg (162 lb 12 8 oz)   SpO2 99%   BMI 28 84 kg/m²     Physical Exam  Constitutional:       General: She is not in acute distress  Appearance: She is well-developed  She is not diaphoretic  HENT:      Head: Normocephalic and atraumatic        Right Ear: External ear normal       Left Ear: External ear normal       Nose: Nose normal    Eyes:      General: No scleral icterus  Right eye: No discharge  Left eye: No discharge  Conjunctiva/sclera: Conjunctivae normal    Neck:      Thyroid: No thyromegaly  Vascular: No JVD  Trachea: No tracheal deviation  Cardiovascular:      Rate and Rhythm: Normal rate and regular rhythm  Heart sounds: Normal heart sounds  No murmur heard  No friction rub  No gallop  Pulmonary:      Effort: Pulmonary effort is normal  No respiratory distress  Breath sounds: Normal breath sounds  No wheezing or rales  Chest:      Chest wall: No tenderness  Abdominal:      General: Bowel sounds are normal  There is no distension  Palpations: Abdomen is soft  Tenderness: There is no abdominal tenderness  There is no guarding or rebound  Musculoskeletal:         General: No tenderness  Normal range of motion  Cervical back: Normal range of motion and neck supple  Lymphadenopathy:      Cervical: No cervical adenopathy  Skin:     General: Skin is warm and dry  Findings: No erythema or rash  Neurological:      Mental Status: She is alert and oriented to person, place, and time  Cranial Nerves: No cranial nerve deficit  Motor: No abnormal muscle tone        Coordination: Coordination normal    Psychiatric:         Judgment: Judgment normal            Current Medications       Current Outpatient Medications:     escitalopram (LEXAPRO) 10 mg tablet, take 1 tablet by mouth every morning, Disp: 90 tablet, Rfl: 0    Health Maintenance     Health Maintenance   Topic Date Due    Hepatitis C Screening  Never done    COVID-19 Vaccine (1) Never done    HIV Screening  Never done    Annual Physical  Never done    Cervical Cancer Screening  04/07/2020    Influenza Vaccine (1) 09/01/2021    Breast Cancer Screening: Mammogram  01/25/2022    BMI: Followup Plan  04/19/2022    Depression Screening  02/03/2023    BMI: Adult  02/03/2023    DTaP,Tdap,and Td Vaccines (2 - Td or Tdap) 01/09/2031    Pneumococcal Vaccine: Pediatrics (0 to 5 Years) and At-Risk Patients (6 to 59 Years)  Aged Out    HIB Vaccine  Aged Out    Hepatitis B Vaccine  Aged Out    IPV Vaccine  Aged Out    Hepatitis A Vaccine  Aged Out    Meningococcal ACWY Vaccine  Aged Out    HPV Vaccine  Aged [employer] History   Administered Date(s) Administered    Hep B, adult 07/24/2014, 08/26/2014, 01/28/2015    INFLUENZA 01/09/2021    Influenza Quadrivalent Recombinant Preservative Free IM 01/09/2021    Influenza, recombinant, quadrivalent,injectable, preservative free 01/09/2021    Tdap 1975, 01/09/2021         Fatou David MD  [facility] of BEHAVIORAL MEDICINE AT [facility]

## 2022-02-04 ENCOUNTER — APPOINTMENT (OUTPATIENT)
Dept: LAB | Facility: CLINIC | Age: 47
End: 2022-02-04
Payer: COMMERCIAL

## 2022-02-04 ENCOUNTER — TELEPHONE (OUTPATIENT)
Dept: ADMINISTRATIVE | Facility: OTHER | Age: 47
End: 2022-02-04

## 2022-02-04 DIAGNOSIS — R51.9 FACIAL PAIN: ICD-10-CM

## 2022-02-04 DIAGNOSIS — H92.02 LEFT EAR PAIN: ICD-10-CM

## 2022-02-04 LAB
ALBUMIN SERPL BCP-MCNC: 4.1 G/DL (ref 3.5–5)
ALP SERPL-CCNC: 69 U/L (ref 46–116)
ALT SERPL W P-5'-P-CCNC: 24 U/L (ref 12–78)
ANION GAP SERPL CALCULATED.3IONS-SCNC: 3 MMOL/L (ref 4–13)
AST SERPL W P-5'-P-CCNC: 15 U/L (ref 5–45)
BASOPHILS # BLD AUTO: 0.02 THOUSANDS/ΜL (ref 0–0.1)
BASOPHILS NFR BLD AUTO: 1 % (ref 0–1)
BILIRUB SERPL-MCNC: 0.93 MG/DL (ref 0.2–1)
BUN SERPL-MCNC: 10 MG/DL (ref 5–25)
CALCIUM SERPL-MCNC: 8.8 MG/DL (ref 8.3–10.1)
CHLORIDE SERPL-SCNC: 108 MMOL/L (ref 100–108)
CO2 SERPL-SCNC: 27 MMOL/L (ref 21–32)
CREAT SERPL-MCNC: 0.86 MG/DL (ref 0.6–1.3)
CRP SERPL QL: <3 MG/L
EOSINOPHIL # BLD AUTO: 0.2 THOUSAND/ΜL (ref 0–0.61)
EOSINOPHIL NFR BLD AUTO: 5 % (ref 0–6)
ERYTHROCYTE [DISTWIDTH] IN BLOOD BY AUTOMATED COUNT: 12.7 % (ref 11.6–15.1)
ERYTHROCYTE [SEDIMENTATION RATE] IN BLOOD: <1 MM/HOUR (ref 0–19)
GFR SERPL CREATININE-BSD FRML MDRD: 82 ML/MIN/1.73SQ M
GLUCOSE P FAST SERPL-MCNC: 83 MG/DL (ref 65–99)
HCT VFR BLD AUTO: 38.2 % (ref 34.8–46.1)
HGB BLD-MCNC: 13 G/DL (ref 11.5–15.4)
IMM GRANULOCYTES # BLD AUTO: 0.01 THOUSAND/UL (ref 0–0.2)
IMM GRANULOCYTES NFR BLD AUTO: 0 % (ref 0–2)
LYMPHOCYTES # BLD AUTO: 1.28 THOUSANDS/ΜL (ref 0.6–4.47)
LYMPHOCYTES NFR BLD AUTO: 34 % (ref 14–44)
MCH RBC QN AUTO: 30.5 PG (ref 26.8–34.3)
MCHC RBC AUTO-ENTMCNC: 34 G/DL (ref 31.4–37.4)
MCV RBC AUTO: 90 FL (ref 82–98)
MONOCYTES # BLD AUTO: 0.33 THOUSAND/ΜL (ref 0.17–1.22)
MONOCYTES NFR BLD AUTO: 9 % (ref 4–12)
NEUTROPHILS # BLD AUTO: 1.92 THOUSANDS/ΜL (ref 1.85–7.62)
NEUTS SEG NFR BLD AUTO: 51 % (ref 43–75)
NRBC BLD AUTO-RTO: 0 /100 WBCS
PLATELET # BLD AUTO: 235 THOUSANDS/UL (ref 149–390)
PMV BLD AUTO: 9.1 FL (ref 8.9–12.7)
POTASSIUM SERPL-SCNC: 4.1 MMOL/L (ref 3.5–5.3)
PROT SERPL-MCNC: 6.8 G/DL (ref 6.4–8.2)
RBC # BLD AUTO: 4.26 MILLION/UL (ref 3.81–5.12)
SODIUM SERPL-SCNC: 138 MMOL/L (ref 136–145)
WBC # BLD AUTO: 3.76 THOUSAND/UL (ref 4.31–10.16)

## 2022-02-04 PROCEDURE — 85652 RBC SED RATE AUTOMATED: CPT

## 2022-02-04 PROCEDURE — 80053 COMPREHEN METABOLIC PANEL: CPT

## 2022-02-04 PROCEDURE — 36415 COLL VENOUS BLD VENIPUNCTURE: CPT

## 2022-02-04 PROCEDURE — 85025 COMPLETE CBC W/AUTO DIFF WBC: CPT

## 2022-02-04 PROCEDURE — 86140 C-REACTIVE PROTEIN: CPT

## 2022-02-04 NOTE — TELEPHONE ENCOUNTER
----- Message from Alonso Partida sent at 2/3/2022  1:28 PM EST -----  Regarding: mammo  02/03/22 1:28 PM    Hello, our patient Lamin Hugo has had Mammogram completed/performed  Please assist in updating the patient chart by pulling the Care Everywhere (CE) document  The date of service is 2022       Thank you,  Alonso Chase 66

## 2022-02-04 NOTE — TELEPHONE ENCOUNTER
Upon review of the In Basket request we were able to locate, review, and update the patient chart as requested for Mammogram     Any additional questions or concerns should be emailed to the Practice Liaisons via [email or web address] email, please do not reply via In Basket      Thank you  Medina Anne MA

## 2022-02-11 ENCOUNTER — HOSPITAL ENCOUNTER (OUTPATIENT)
Dept: CT IMAGING | Facility: CLINIC | Age: 47
Discharge: HOME | End: 2022-02-11
Payer: COMMERCIAL

## 2022-02-11 DIAGNOSIS — J01.00 SUBACUTE MAXILLARY SINUSITIS: ICD-10-CM

## 2022-02-11 PROCEDURE — 70486 CT MAXILLOFACIAL W/O DYE: CPT

## 2022-02-11 PROCEDURE — G1004 CDSM NDSC: HCPCS

## 2022-02-14 ENCOUNTER — TELEPHONE (OUTPATIENT)
Dept: INTERNAL MEDICINE CLINIC | Facility: CLINIC | Age: 47
End: 2022-02-14

## 2022-02-14 NOTE — TELEPHONE ENCOUNTER
----- Message from Baljit Hernandez MD sent at 2/14/2022  3:53 PM EST -----   CT of the sinuses only shows minor thickening of the sinuses wall which  does not seem to be significant

## 2022-03-02 ENCOUNTER — TELEPHONE (OUTPATIENT)
Dept: NEUROLOGY | Facility: CLINIC | Age: 47
End: 2022-03-02

## 2022-03-02 NOTE — TELEPHONE ENCOUNTER
Patient calling to schedule new patient appointment for left side pain of head that is not headaches  No imaging done  Triage intake sent

## 2022-03-14 DIAGNOSIS — F41.9 ANXIETY: ICD-10-CM

## 2022-03-14 RX ORDER — ESCITALOPRAM OXALATE 10 MG/1
TABLET ORAL
Qty: 90 TABLET | Refills: 0 | Status: SHIPPED | OUTPATIENT
Start: 2022-03-14

## 2022-03-17 ENCOUNTER — OFFICE VISIT (OUTPATIENT)
Dept: INTERNAL MEDICINE CLINIC | Facility: CLINIC | Age: 47
End: 2022-03-17
Payer: COMMERCIAL

## 2022-03-17 VITALS
SYSTOLIC BLOOD PRESSURE: 120 MMHG | HEART RATE: 79 BPM | DIASTOLIC BLOOD PRESSURE: 74 MMHG | WEIGHT: 159.8 LBS | RESPIRATION RATE: 17 BRPM | BODY MASS INDEX: 28.31 KG/M2 | HEIGHT: 63 IN | OXYGEN SATURATION: 97 % | TEMPERATURE: 98.1 F

## 2022-03-17 DIAGNOSIS — M19.90 ARTHRITIS: Primary | ICD-10-CM

## 2022-03-17 PROBLEM — Z87.410 HISTORY OF CERVICAL DYSPLASIA: Status: ACTIVE | Noted: 2021-05-06

## 2022-03-17 PROCEDURE — 3008F BODY MASS INDEX DOCD: CPT | Performed by: INTERNAL MEDICINE

## 2022-03-17 PROCEDURE — 1036F TOBACCO NON-USER: CPT | Performed by: INTERNAL MEDICINE

## 2022-03-17 PROCEDURE — 99213 OFFICE O/P EST LOW 20 MIN: CPT | Performed by: INTERNAL MEDICINE

## 2022-03-17 RX ORDER — LORATADINE 10 MG/1
10 TABLET ORAL DAILY
COMMUNITY
Start: 2022-02-23

## 2022-03-17 RX ORDER — MELOXICAM 7.5 MG/1
7.5 TABLET ORAL 2 TIMES DAILY
Qty: 30 TABLET | Refills: 1 | Status: SHIPPED | OUTPATIENT
Start: 2022-03-17

## 2022-03-17 RX ORDER — FLUTICASONE PROPIONATE 50 MCG
1 SPRAY, SUSPENSION (ML) NASAL 2 TIMES DAILY
COMMUNITY
Start: 2022-02-23

## 2022-03-17 NOTE — PROGRESS NOTES
INTERNAL MEDICINE FOLLOW-UP OFFICE VISIT  [facility] of BEHAVIORAL MEDICINE AT [facility]    NAME: Lamin Hugo  AGE: 42 y o  SEX: female  : 1975   MRN: [id number]    DATE: 3/17/2022  TIME: 4:46 PM    Assessment and Plan     Diagnoses and all orders for this visit:    Arthritis  -     meloxicam (Mobic) 7 5 mg tablet; Take 1 tablet (7 5 mg total) by mouth 2 (two) times a day   she has pain in her left hip, right ankle and right elbow  She was told to take Mobic for a few days and follow up with her orthopedic doctor  If the multiple joint pains continue, she will see a rheumatologist     Blood work was done for Lyme disease, CARLOS ALBERTO, CRP, sed rate in the past which were all within normal range  Other orders  -     fluticasone (FLONASE) 50 mcg/act nasal spray; 1 spray 2 (two) times a day  -     loratadine (CLARITIN) 10 mg tablet; Take 10 mg by mouth daily        - Counseling Documentation: patient was counseled regarding: instructions for management, risk factor reductions, prognosis, patient and family education, risks and benefits of treatment options and importance of compliance with treatment  - Medication Side Effects: Adverse side effects of medications were reviewed with the patient/guardian today  Return to office in:   As needed    Chief Complaint     Chief Complaint   Patient presents with    Joint Swelling     foot,ankles and elbows        History of Present Illness     HPI   complains of pain in multiple joints  The following portions of the patient's history were reviewed and updated as appropriate: allergies, current medications, past family history, past medical history, past social history, past surgical history and problem list     Review of Systems     Review of Systems   Constitutional: Negative for chills, diaphoresis, fatigue and fever     HENT: Negative for congestion, ear discharge, ear pain, hearing loss, postnasal drip, rhinorrhea, sinus pressure, sinus pain, sneezing, sore throat and voice change  Eyes: Negative for pain, discharge, redness and visual disturbance  Respiratory: Negative for cough, chest tightness, shortness of breath and wheezing  Cardiovascular: Negative for chest pain, palpitations and leg swelling  Gastrointestinal: Negative for abdominal distention, abdominal pain, blood in stool, constipation, diarrhea, nausea and vomiting  Endocrine: Negative for cold intolerance, heat intolerance, polydipsia, polyphagia and polyuria  Genitourinary: Negative for dysuria, flank pain, frequency, hematuria and urgency  Musculoskeletal: Positive for arthralgias  Negative for back pain, gait problem, joint swelling, myalgias, neck pain and neck stiffness  Skin: Negative for rash  Neurological: Negative for dizziness, tremors, syncope, facial asymmetry, speech difficulty, weakness, light-headedness, numbness and headaches  Hematological: Does not bruise/bleed easily  Psychiatric/Behavioral: Negative for behavioral problems, confusion and sleep disturbance  The patient is not nervous/anxious  Active Problem List     Patient Active Problem List   Diagnosis    Skin abnormality    Anxiety    Atopic dermatitis    Hyperbilirubinemia    Joint pain, hip    Knee pain    Malaise and fatigue    Carpal tunnel syndrome, bilateral    Overweight (BMI 25 0-29  9)    Gastroesophageal reflux disease without esophagitis    Coronavirus infection    SOB (shortness of breath)    History of cervical dysplasia       Objective     /74 (BP Location: Left arm, Patient Position: Sitting, Cuff Size: Standard)   Pulse 79   Temp 98 1 °F (36 7 °C) (Tympanic)   Resp 17   Ht 5' 3" (1 6 m)   Wt 72 5 kg (159 lb 12 8 oz)   SpO2 97%   BMI 28 31 kg/m²     Physical Exam  Constitutional:       General: She is not in acute distress  Appearance: She is well-developed  She is not diaphoretic  HENT:      Head: Normocephalic and atraumatic        Right Ear: External ear normal  Left Ear: External ear normal       Nose: Nose normal    Eyes:      General: No scleral icterus  Right eye: No discharge  Left eye: No discharge  Conjunctiva/sclera: Conjunctivae normal    Neck:      Thyroid: No thyromegaly  Vascular: No JVD  Trachea: No tracheal deviation  Cardiovascular:      Rate and Rhythm: Normal rate and regular rhythm  Heart sounds: Normal heart sounds  No murmur heard  No friction rub  No gallop  Pulmonary:      Effort: Pulmonary effort is normal  No respiratory distress  Breath sounds: Normal breath sounds  No wheezing or rales  Chest:      Chest wall: No tenderness  Abdominal:      General: Bowel sounds are normal  There is no distension  Palpations: Abdomen is soft  Tenderness: There is no abdominal tenderness  There is no guarding or rebound  Musculoskeletal:         General: No tenderness  Normal range of motion  Cervical back: Normal range of motion and neck supple  Lymphadenopathy:      Cervical: No cervical adenopathy  Skin:     General: Skin is warm and dry  Findings: No erythema or rash  Neurological:      Mental Status: She is alert and oriented to person, place, and time  Cranial Nerves: No cranial nerve deficit  Motor: No abnormal muscle tone        Coordination: Coordination normal    Psychiatric:         Judgment: Judgment normal            Current Medications       Current Outpatient Medications:     escitalopram (LEXAPRO) 10 mg tablet, take 1 tablet by mouth every morning, Disp: 90 tablet, Rfl: 0    meloxicam (Mobic) 7 5 mg tablet, Take 1 tablet (7 5 mg total) by mouth 2 (two) times a day, Disp: 30 tablet, Rfl: 1    Health Maintenance     Health Maintenance   Topic Date Due    Hepatitis C Screening  Never done    COVID-19 Vaccine (1) Never done    HIV Screening  Never done    Annual Physical  Never done    Cervical Cancer Screening  04/07/2020    Influenza Vaccine (1) 09/01/2021    BMI: Followup Plan  04/19/2022    Breast Cancer Screening: Mammogram  01/21/2023    Depression Screening  02/03/2023    BMI: Adult  03/17/2023    DTaP,Tdap,and Td Vaccines (2 - Td or Tdap) 01/09/2031    Pneumococcal Vaccine: Pediatrics (0 to 5 Years) and At-Risk Patients (6 to 59 Years)  Aged Out    HIB Vaccine  Aged Out    Hepatitis B Vaccine  Aged Out    IPV Vaccine  Aged Out    Hepatitis A Vaccine  Aged Out    Meningococcal ACWY Vaccine  Aged Out    HPV Vaccine  Aged [employer] History   Administered Date(s) Administered    Hep B, adult 07/24/2014, 08/26/2014, 01/28/2015    INFLUENZA 01/09/2021    Influenza Quadrivalent Recombinant Preservative Free IM 01/09/2021    Influenza, recombinant, quadrivalent,injectable, preservative free 01/09/2021    Tdap 1975, 01/09/2021         Allan Dumont MD  [facility] of BEHAVIORAL MEDICINE AT [facility]

## 2022-03-25 ENCOUNTER — TELEPHONE (OUTPATIENT)
Dept: NEUROLOGY | Facility: CLINIC | Age: 47
End: 2022-03-25

## 2022-03-25 NOTE — TELEPHONE ENCOUNTER
Called patient leave a message to confirm appointment for Tuesday March 29, 2022 with Abena Jack, [address]

## 2022-10-27 ENCOUNTER — APPOINTMENT (OUTPATIENT)
Dept: LAB | Facility: CLINIC | Age: 47
End: 2022-10-27
Payer: COMMERCIAL

## 2022-10-27 DIAGNOSIS — B96.89 BACTERIAL CHOLANGITIS: ICD-10-CM

## 2022-10-27 DIAGNOSIS — Z11.3 SCREENING EXAMINATION FOR VENEREAL DISEASE: ICD-10-CM

## 2022-10-27 DIAGNOSIS — N76.0 PYOCOLPOS: ICD-10-CM

## 2022-10-27 DIAGNOSIS — K83.09 BACTERIAL CHOLANGITIS: ICD-10-CM

## 2022-10-27 PROCEDURE — 87389 HIV-1 AG W/HIV-1&-2 AB AG IA: CPT

## 2022-10-27 PROCEDURE — 86780 TREPONEMA PALLIDUM: CPT

## 2022-10-27 PROCEDURE — 86706 HEP B SURFACE ANTIBODY: CPT

## 2022-10-27 PROCEDURE — 86593 SYPHILIS TEST NON-TREP QUANT: CPT

## 2022-10-27 PROCEDURE — 86592 SYPHILIS TEST NON-TREP QUAL: CPT

## 2022-10-27 PROCEDURE — 36415 COLL VENOUS BLD VENIPUNCTURE: CPT

## 2022-10-27 PROCEDURE — 86803 HEPATITIS C AB TEST: CPT

## 2022-10-28 LAB
HBV SURFACE AB SER-ACNC: >1000 MIU/ML
HCV AB SER QL: NORMAL
HIV 1+2 AB+HIV1 P24 AG SERPL QL IA: NORMAL
RPR SER QL: REACTIVE
RPR SER-TITR: ABNORMAL {TITER}

## 2022-10-31 LAB — T PALLIDUM AB SER QL IF: NON REACTIVE

## 2023-11-18 ENCOUNTER — OFFICE VISIT (OUTPATIENT)
Age: 48
End: 2023-11-18
Payer: COMMERCIAL

## 2023-11-18 VITALS
WEIGHT: 165 LBS | TEMPERATURE: 98.3 F | RESPIRATION RATE: 16 BRPM | OXYGEN SATURATION: 98 % | BODY MASS INDEX: 29.23 KG/M2 | HEART RATE: 72 BPM

## 2023-11-18 DIAGNOSIS — I88.9 ADENITIS: Primary | ICD-10-CM

## 2023-11-18 PROCEDURE — 99213 OFFICE O/P EST LOW 20 MIN: CPT | Performed by: PHYSICIAN ASSISTANT

## 2023-11-18 NOTE — PROGRESS NOTES
[facility]        NAME: Juliette Palomino is a 57 y.o. female  : 1975    MRN: [id number]  DATE: 2023  TIME: 10:29 AM    Assessment and Plan   Adenitis [I88.9]  1. Adenitis              Patient Instructions     Reassurance -discussed how lymph nodes may present. However, explained that there are no masses palpable on the area in which the patient is describing. Warm compresses locally as needed    Recommend a wait and watch approach -continue to monitor and if there are any changes follow-up with your primary care doctor. Follow up with PCP in 3-5 days. Proceed to  ER if symptoms worsen. Chief Complaint     Chief Complaint   Patient presents with    Mass     Pt states she has a lump on the left side of her neck since yesterday. History of Present Illness       28-year-old female presenting today with complaint of feeling a mass on the left side of her neck for approximately 1 day. Patient denies a prior presentation, injury, trauma, surgery, URI, pharyngitis, fever, chills, headaches, malaise, fatigue, cough, hemoptysis, weight loss, anorexia or decreased appetite. Review of Systems   Review of Systems   Constitutional:  Negative for activity change, appetite change, chills, fatigue and fever. HENT:  Negative for congestion, drooling, sinus pain, sore throat and trouble swallowing. Respiratory:  Negative for cough. Cardiovascular:  Negative for palpitations. Gastrointestinal:  Negative for abdominal pain, diarrhea, nausea and vomiting. Musculoskeletal:  Negative for myalgias. Skin:  Negative for rash. Neurological:  Negative for dizziness, facial asymmetry, light-headedness and headaches.          Current Medications       Current Outpatient Medications:     escitalopram (LEXAPRO) 10 mg tablet, take 1 tablet by mouth every morning, Disp: 90 tablet, Rfl: 0    fluticasone (FLONASE) 50 mcg/act nasal spray, 1 spray 2 (two) times a day (Patient not taking: Reported on 2023), Disp: , Rfl:     loratadine (CLARITIN) 10 mg tablet, Take 10 mg by mouth daily (Patient not taking: Reported on 2023), Disp: , Rfl:     meloxicam (Mobic) 7.5 mg tablet, Take 1 tablet (7.5 mg total) by mouth 2 (two) times a day (Patient not taking: Reported on 2023), Disp: 30 tablet, Rfl: 1    Current Allergies     Allergies as of 2023 - Reviewed 2023   Allergen Reaction Noted    Morphine Hives 10/24/2015            The following portions of the patient's history were reviewed and updated as appropriate: allergies, current medications, past family history, past medical history, past social history, past surgical history and problem list.     Past Medical History:   Diagnosis Date    Anxiety        Past Surgical History:   Procedure Laterality Date     SECTION      LAST ASSESSED: [id number]    CYST REMOVAL      HAND EXCISION OF TENDON CYST LAST ASSESSED: [id number]       Family History   Problem Relation Age of Onset    COPD Mother     Hypertension Mother     Breast cancer Mother     Osteoarthritis Mother         GENERALIZED    Kidney disease Mother     Hypertension Father         BENIGN ESSENTIAL    Osteoarthritis Father         GENERALIZED    Heart disease Father     Kidney cancer Maternal Grandfather     Breast cancer Maternal Grandfather     Prostate cancer Maternal Grandfather     Heart attack Maternal Grandfather     Stroke Family         CEREBRAL INFARCTION CVA    Diabetes Paternal Aunt     Breast cancer Paternal Aunt     Heart attack Paternal Aunt     Kidney cancer Other     Breast cancer Other     Prostate cancer Other     Heart attack Other     Breast cancer Maternal Aunt     Heart attack Maternal Aunt     Kidney cancer Paternal Uncle     Breast cancer Paternal Uncle     Prostate cancer Maternal Uncle          Medications have been verified.         Objective   Pulse 72   Temp 98.3 °F (36.8 °C)   Resp 16   Wt 74.8 kg (165 lb)   SpO2 98%   BMI 29.23 kg/m²        Physical Exam     Physical Exam  Vitals and nursing note reviewed. Constitutional:       General: She is not in acute distress. Appearance: Normal appearance. She is not ill-appearing. HENT:      Head: Normocephalic and atraumatic. Right Ear: Tympanic membrane, ear canal and external ear normal.      Left Ear: Tympanic membrane, ear canal and external ear normal.      Nose: Nose normal. No congestion or rhinorrhea. Mouth/Throat:      Mouth: Mucous membranes are moist.      Pharynx: Oropharynx is clear. Eyes:      General: No scleral icterus. Extraocular Movements: Extraocular movements intact. Conjunctiva/sclera: Conjunctivae normal.      Pupils: Pupils are equal, round, and reactive to light. Neck:      Comments: Less than 0.5 cm submandibular nontender palpable lymph nodes noted. There are no palpable masses of the left anterior or posterior chain of the neck. There is no swelling, inflammation, erythema, discoloration, or tenderness on palpation of the neck. Cardiovascular:      Rate and Rhythm: Normal rate and regular rhythm. Pulses: Normal pulses. Heart sounds: Normal heart sounds. Pulmonary:      Effort: Pulmonary effort is normal. No respiratory distress. Breath sounds: Normal breath sounds. Musculoskeletal:         General: Normal range of motion. Cervical back: Normal range of motion. No tenderness. Lymphadenopathy:      Cervical: Cervical adenopathy present. Skin:     General: Skin is warm and dry. Findings: No erythema or lesion. Neurological:      General: No focal deficit present. Mental Status: She is alert and oriented to person, place, and time.       Coordination: Coordination normal.      Gait: Gait normal.   Psychiatric:         Mood and Affect: Mood normal.         Behavior: Behavior normal.

## 2023-11-18 NOTE — PATIENT INSTRUCTIONS
Adenitis   AMBULATORY CARE:   Adenitis  is a condition that causes your lymph nodes to become swollen and tender. You may also have a fever. Adenitis is a sign of infection usually caused by bacteria. Call your local emergency number (911 in the [address]) if:   You have trouble breathing or swallowing. Seek care immediately if:   You have new or worsening redness or swelling. You develop a large, soft bump that may leak pus. Call your doctor if:   Your symptoms do not improve after 10 days of treatment. You have questions or concerns about your condition or care. Treatment  may include any of the following:  Antibiotics  help treat a bacterial infection. Acetaminophen  decreases pain and fever. It is available without a doctor's order. Ask how much to take and how often to take it. Follow directions. Read the labels of all other medicines you are using to see if they also contain acetaminophen, or ask your doctor or pharmacist. Acetaminophen can cause liver damage if not taken correctly. NSAIDs , such as ibuprofen, help decrease swelling, pain, and fever. NSAIDs can cause stomach bleeding or kidney problems in certain people. If you take blood thinner medicine, always ask your healthcare provider if NSAIDs are safe for you. Always read the medicine label and follow directions. Manage your symptoms:   Apply moist heat  on your swollen lymph nodes for 20 to 30 minutes every 2 hours or as directed. Heat helps decrease pain and swelling. You can make a moist heat pack by soaking a small towel in hot water. Let it cool until you can hold it with your bare hands. Then wring out the extra water. Place the towel in a plastic bag, and wrap the bag with a dry towel around the bag. Place the pack over your swollen lymph nodes. Elevate your head and upper back. Keep your head and upper back elevated when you rest, such as in a recliner. Place extra pillows under your head and neck when you sleep in bed. Elevation helps decrease swelling. Prevent an infection:       Wash your hands often. Wash your hands several times each day. Wash after you use the bathroom, change a child's diaper, and before you prepare or eat food. Use soap and water every time. Rub your soapy hands together, lacing your fingers. Wash the front and back of your hands, and in between your fingers. Use the fingers of one hand to scrub under the fingernails of the other hand. Wash for at least 20 seconds. Rinse with warm, running water for several seconds. Then dry your hands with a clean towel or paper towel. Use hand  that contains alcohol if soap and water are not available. Do not touch your eyes, nose, or mouth without washing your hands first.         Cover a sneeze or cough. Use a tissue that covers your mouth and nose. Throw the tissue away in a trash can right away. Use the bend of your arm if a tissue is not available. Wash your hands well with soap and water or use a hand . Clean surfaces often. Clean doorknobs, countertops, cell phones, and other surfaces that are touched often. Use a disinfecting wipe, a single-use sponge, or a cloth you can wash and reuse. Use disinfecting  if you do not have wipes. You can create a disinfecting  by mixing 1 part bleach with 10 parts water. Ask about vaccines you may need. Vaccines help prevent diseases caused by some viruses and bacteria. Get the influenza (flu) vaccine as soon as recommended each year. The flu vaccine is usually available starting in September or October. Flu viruses change, so it is important to get a flu vaccine every year. Get the pneumonia vaccine if recommended. This vaccine is usually recommended every 5 years. Your provider will tell you when to get this vaccine, if needed. He or she can tell you if you should get other vaccines, and when to get them. Follow up with your doctor within 2 days:   You may be referred to a dentist or need more tests. Write down your questions so you remember to ask them during your visits. © Copyright [employer] 2023 Information is for End User's use only and may not be sold, redistributed or otherwise used for commercial purposes. The above information is an  only. It is not intended as medical advice for individual conditions or treatments. Talk to your doctor, nurse or pharmacist before following any medical regimen to see if it is safe and effective for you.

## 2023-12-13 ENCOUNTER — TELEPHONE (OUTPATIENT)
Age: 48
End: 2023-12-13

## 2023-12-13 ENCOUNTER — APPOINTMENT (OUTPATIENT)
Age: 48
End: 2023-12-13
Payer: COMMERCIAL

## 2023-12-13 ENCOUNTER — OFFICE VISIT (OUTPATIENT)
Age: 48
End: 2023-12-13

## 2023-12-13 VITALS
WEIGHT: 165 LBS | RESPIRATION RATE: 18 BRPM | OXYGEN SATURATION: 98 % | TEMPERATURE: 97.5 F | HEART RATE: 78 BPM | DIASTOLIC BLOOD PRESSURE: 72 MMHG | SYSTOLIC BLOOD PRESSURE: 118 MMHG | BODY MASS INDEX: 29.23 KG/M2

## 2023-12-13 DIAGNOSIS — M25.562 ACUTE PAIN OF LEFT KNEE: ICD-10-CM

## 2023-12-13 DIAGNOSIS — M25.562 ACUTE PAIN OF LEFT KNEE: Primary | ICD-10-CM

## 2023-12-13 DIAGNOSIS — J32.4 CHRONIC PANSINUSITIS: ICD-10-CM

## 2023-12-13 PROCEDURE — 99214 OFFICE O/P EST MOD 30 MIN: CPT | Performed by: FAMILY MEDICINE

## 2023-12-13 PROCEDURE — 73562 X-RAY EXAM OF KNEE 3: CPT

## 2023-12-13 RX ORDER — AMOXICILLIN AND CLAVULANATE POTASSIUM 875; 125 MG/1; MG/1
1 TABLET, FILM COATED ORAL EVERY 12 HOURS SCHEDULED
Qty: 14 TABLET | Refills: 0 | Status: SHIPPED | OUTPATIENT
Start: 2023-12-13 | End: 2023-12-20

## 2023-12-13 RX ORDER — METHYLPREDNISOLONE 4 MG/1
TABLET ORAL
Qty: 21 EACH | Refills: 0 | Status: SHIPPED | OUTPATIENT
Start: 2023-12-13

## 2023-12-13 NOTE — TELEPHONE ENCOUNTER
----- Message from Ignacio Mercado DO sent at 12/13/2023  2:56 PM EST -----  Xray is negative for bony issues/injury.  I recommend proceeding with  physical therapy

## 2023-12-13 NOTE — PROGRESS NOTES
Name: Amalia De Anda      : 1975      MRN: [id number]  Encounter Provider: Shilpa Bergeron DO  Encounter Date: 2023   Encounter department: [facility] [place]    Assessment & Plan     1. Acute pain of left knee- will evaluate for severe bony pathology. Likely OA related versus bursitis.  Refractory to otc analgesia. Will provided steroida inflammatories  -     XR knee 3 vw left non injury; Future; Expected date: 2023  -     methylPREDNISolone 4 MG tablet therapy pack; Use as directed on package    2. Chronic pansinusitis  -     amoxicillin-clavulanate (AUGMENTIN) 875-125 mg per tablet; Take 1 tablet by mouth every 12 (twelve) hours for 7 days        Depression Screening and Follow-up Plan: Patient's depression screening was positive with a PHQ-2 score of 4. Their PHQ-9 score was 11.         Subjective      Left knee pain  Onset a month  Posterior medial aspect of the knee. Throbbing in quality.   Tried advil but no improvement.   No trauma. Driving a lot for work so she is in and out of the car a lot.   No redness or swelling of the knee.         Review of Systems   Constitutional:  Positive for fatigue. Negative for fever.   HENT:  Positive for congestion, sinus pressure and sinus pain.    Respiratory:  Negative for cough.        Current Outpatient Medications on File Prior to Visit   Medication Sig    escitalopram (LEXAPRO) 10 mg tablet take 1 tablet by mouth every morning    fluticasone (FLONASE) 50 mcg/act nasal spray 1 spray 2 (two) times a day (Patient not taking: Reported on 2023)    loratadine (CLARITIN) 10 mg tablet Take 10 mg by mouth daily (Patient not taking: Reported on 2023)    meloxicam (Mobic) 7.5 mg tablet Take 1 tablet (7.5 mg total) by mouth 2 (two) times a day (Patient not taking: Reported on 2023)       Objective     /72 (BP Location: Left arm, Patient Position: Sitting, Cuff Size: Large)   Pulse 78   Temp 97.5 °F (36.4  °C) (Temporal)   Resp 18   Wt 74.8 kg (165 lb)   SpO2 98%   BMI 29.23 kg/m²     Physical Exam  HENT:      Head: Normocephalic.   Eyes:      Conjunctiva/sclera: Conjunctivae normal.   Cardiovascular:      Rate and Rhythm: Normal rate and regular rhythm.   Pulmonary:      Effort: Pulmonary effort is normal.      Breath sounds: Normal breath sounds.   Abdominal:      General: Bowel sounds are normal.      Palpations: Abdomen is soft.   Neurological:      Mental Status: She is alert.       Shilpa Bergeron DO

## 2025-07-17 ENCOUNTER — HOSPITAL ENCOUNTER (OUTPATIENT)
Dept: CT IMAGING | Facility: HOSPITAL | Age: 50
End: 2025-07-17
Payer: COMMERCIAL

## 2025-07-17 ENCOUNTER — APPOINTMENT (OUTPATIENT)
Age: 50
End: 2025-07-17
Payer: COMMERCIAL

## 2025-07-17 ENCOUNTER — OFFICE VISIT (OUTPATIENT)
Age: 50
End: 2025-07-17
Payer: COMMERCIAL

## 2025-07-17 VITALS
RESPIRATION RATE: 14 BRPM | TEMPERATURE: 97.4 F | DIASTOLIC BLOOD PRESSURE: 82 MMHG | OXYGEN SATURATION: 98 % | SYSTOLIC BLOOD PRESSURE: 122 MMHG | WEIGHT: 149 LBS | HEIGHT: 63 IN | BODY MASS INDEX: 26.4 KG/M2 | HEART RATE: 70 BPM

## 2025-07-17 DIAGNOSIS — R10.9 ACUTE LEFT FLANK PAIN: ICD-10-CM

## 2025-07-17 DIAGNOSIS — R10.9 ACUTE LEFT FLANK PAIN: Primary | ICD-10-CM

## 2025-07-17 LAB
ALBUMIN SERPL BCG-MCNC: 4.4 G/DL (ref 3.5–5)
ALP SERPL-CCNC: 67 U/L (ref 34–104)
ALT SERPL W P-5'-P-CCNC: 15 U/L (ref 7–52)
ANION GAP SERPL CALCULATED.3IONS-SCNC: 9 MMOL/L (ref 4–13)
AST SERPL W P-5'-P-CCNC: 18 U/L (ref 13–39)
BASOPHILS # BLD AUTO: 0.06 THOUSANDS/ÂΜL (ref 0–0.1)
BASOPHILS NFR BLD AUTO: 1 % (ref 0–1)
BILIRUB SERPL-MCNC: 0.74 MG/DL (ref 0.2–1)
BILIRUB UR QL STRIP: NEGATIVE
BUN SERPL-MCNC: 14 MG/DL (ref 5–25)
CALCIUM SERPL-MCNC: 9.4 MG/DL (ref 8.4–10.2)
CHLORIDE SERPL-SCNC: 104 MMOL/L (ref 96–108)
CLARITY UR: CLEAR
CO2 SERPL-SCNC: 25 MMOL/L (ref 21–32)
COLOR UR: COLORLESS
CREAT SERPL-MCNC: 0.85 MG/DL (ref 0.6–1.3)
EOSINOPHIL # BLD AUTO: 0.26 THOUSAND/ÂΜL (ref 0–0.61)
EOSINOPHIL NFR BLD AUTO: 4 % (ref 0–6)
ERYTHROCYTE [DISTWIDTH] IN BLOOD BY AUTOMATED COUNT: 13 % (ref 11.6–15.1)
GFR SERPL CREATININE-BSD FRML MDRD: 81 ML/MIN/1.73SQ M
GLUCOSE SERPL-MCNC: 83 MG/DL (ref 65–140)
GLUCOSE UR STRIP-MCNC: NEGATIVE MG/DL
HCT VFR BLD AUTO: 39 % (ref 34.8–46.1)
HGB BLD-MCNC: 13.3 G/DL (ref 11.5–15.4)
HGB UR QL STRIP.AUTO: NEGATIVE
IMM GRANULOCYTES # BLD AUTO: 0.03 THOUSAND/UL (ref 0–0.2)
IMM GRANULOCYTES NFR BLD AUTO: 1 % (ref 0–2)
KETONES UR STRIP-MCNC: NEGATIVE MG/DL
LEUKOCYTE ESTERASE UR QL STRIP: NEGATIVE
LYMPHOCYTES # BLD AUTO: 1.85 THOUSANDS/ÂΜL (ref 0.6–4.47)
LYMPHOCYTES NFR BLD AUTO: 29 % (ref 14–44)
MCH RBC QN AUTO: 31.6 PG (ref 26.8–34.3)
MCHC RBC AUTO-ENTMCNC: 34.1 G/DL (ref 31.4–37.4)
MCV RBC AUTO: 93 FL (ref 82–98)
MONOCYTES # BLD AUTO: 0.47 THOUSAND/ÂΜL (ref 0.17–1.22)
MONOCYTES NFR BLD AUTO: 7 % (ref 4–12)
NEUTROPHILS # BLD AUTO: 3.74 THOUSANDS/ÂΜL (ref 1.85–7.62)
NEUTS SEG NFR BLD AUTO: 58 % (ref 43–75)
NITRITE UR QL STRIP: NEGATIVE
NRBC BLD AUTO-RTO: 0 /100 WBCS
PH UR STRIP.AUTO: 5.5 [PH]
PLATELET # BLD AUTO: 307 THOUSANDS/UL (ref 149–390)
PMV BLD AUTO: 10 FL (ref 8.9–12.7)
POTASSIUM SERPL-SCNC: 4.1 MMOL/L (ref 3.5–5.3)
PROT SERPL-MCNC: 6.7 G/DL (ref 6.4–8.4)
PROT UR STRIP-MCNC: NEGATIVE MG/DL
RBC # BLD AUTO: 4.21 MILLION/UL (ref 3.81–5.12)
SODIUM SERPL-SCNC: 138 MMOL/L (ref 135–147)
SP GR UR STRIP.AUTO: 1.01 (ref 1–1.03)
UROBILINOGEN UR STRIP-ACNC: <2 MG/DL
WBC # BLD AUTO: 6.41 THOUSAND/UL (ref 4.31–10.16)

## 2025-07-17 PROCEDURE — 74176 CT ABD & PELVIS W/O CONTRAST: CPT

## 2025-07-17 PROCEDURE — 99214 OFFICE O/P EST MOD 30 MIN: CPT

## 2025-07-17 PROCEDURE — 36415 COLL VENOUS BLD VENIPUNCTURE: CPT

## 2025-07-17 PROCEDURE — 85025 COMPLETE CBC W/AUTO DIFF WBC: CPT

## 2025-07-17 PROCEDURE — 81003 URINALYSIS AUTO W/O SCOPE: CPT

## 2025-07-17 PROCEDURE — 80053 COMPREHEN METABOLIC PANEL: CPT

## 2025-07-17 NOTE — PROGRESS NOTES
Name: Amalia De Anda      : 1975      MRN: [id number]  Encounter Provider: Paty Day PA-C  Encounter Date: 2025   Encounter department: [facility] PR[facility]  :  Assessment & Plan  Acute left flank pain  Because the pt has sharp, severe left back pain in the flank and it is progressively worsening without known trauma.  Exam is not convincing for an MSK injury.  Due to severity of pain in a patient this is pretty stoic, will obtain stat CT renal study to r/o kidney stone.  She will obtain lab work as well to r/o UTI, hematuria, or other renal dysfunction.  Pt does not report tearing sensation, LHN, and VSS with the pain has persisted for days, so less likely to be aortic dissection.  Can use tylenol and NSAIDs for pain for time being.  Offered muscle relaxers, but she declines at this time.      Orders:  •  Comprehensive metabolic panel; Future  •  CBC and differential; Future  •  UA w Reflex to Microscopic w Reflex to Culture  •  CT renal stone study abdomen pelvis wo contrast; Future           History of Present Illness   HPI    Pt is here for left lumbar and flank pain.  Never had this before. Started five days ago.  No trauma or event that exacerbated the pain.  Pain is paraspinal on the left from the flank down to the sacrum.  No overlying skin changes.  Pain is worsening.  Morning and evenings is the worst.  Denies urinary symptoms such as frequency, urgency, dysuria.  She does work out a lot, but she doesn't believe it is MSK in nature.  She also is on her period until two days.      The pain increased today which is what prompted her to come for a visit.  Rates her pain a 7/10, worse with movement.    Review of Systems   Constitutional:  Negative for appetite change, chills, diaphoresis, fatigue and fever.   HENT: Negative.     Respiratory: Negative.     Cardiovascular: Negative.    Gastrointestinal:  Negative for abdominal distention, abdominal pain, blood in stool,  "constipation, diarrhea, nausea and vomiting.   Genitourinary:  Negative for decreased urine volume, difficulty urinating, dysuria, menstrual problem, pelvic pain, urgency and vaginal discharge.   Musculoskeletal:  Positive for back pain and gait problem.   Skin: Negative.    Hematological:  Negative for adenopathy.   All other systems reviewed and are negative.      Objective   /82 (BP Location: Left arm, Patient Position: Sitting)   Pulse 70   Temp (!) 97.4 °F (36.3 °C) (Tympanic)   Resp 14   Ht 5' 3.25\" (1.607 m)   Wt 67.6 kg (149 lb)   SpO2 98%   BMI 26.19 kg/m²      Physical Exam  Constitutional:       General: She is in acute distress.      Appearance: Normal appearance. She is not toxic-appearing.     Cardiovascular:      Rate and Rhythm: Normal rate and regular rhythm.      Heart sounds: Normal heart sounds.   Pulmonary:      Effort: Pulmonary effort is normal. No respiratory distress.      Breath sounds: Normal breath sounds.   Abdominal:      General: Bowel sounds are normal.      Palpations: Abdomen is soft.      Tenderness: There is no abdominal tenderness. There is no right CVA tenderness or left CVA tenderness.     Musculoskeletal:      Lumbar back: No spasms, tenderness or bony tenderness. Decreased range of motion. Negative right straight leg raise test and negative left straight leg raise test.        Back:       Comments: Pt does not have pain with palpation of the paraspinal muscles.  Pain is constant and feels \"deep\" but not anterior in the abdomen.  Worse with getting up from seated position.     Skin:     General: Skin is warm and dry.      Coloration: Skin is not jaundiced.     Neurological:      Mental Status: She is alert and oriented to person, place, and time.      Motor: No weakness or tremor.      Gait: Gait abnormal.     Psychiatric:         Mood and Affect: Mood normal.         Behavior: Behavior is cooperative.         "

## 2025-08-21 ENCOUNTER — RESULTS FOLLOW-UP (OUTPATIENT)
Age: 50
End: 2025-08-21

## 2025-08-21 ENCOUNTER — HOSPITAL ENCOUNTER (OUTPATIENT)
Dept: MRI IMAGING | Facility: CLINIC | Age: 50
Discharge: HOME | End: 2025-08-21
Payer: COMMERCIAL

## 2025-08-21 DIAGNOSIS — R93.2 ABNORMAL CT OF LIVER: ICD-10-CM

## 2025-08-21 DIAGNOSIS — D18.03 HEMANGIOMA OF SPLEEN: Primary | ICD-10-CM

## 2025-08-21 PROCEDURE — 74183 MRI ABD W/O CNTR FLWD CNTR: CPT

## 2025-08-21 PROCEDURE — A9585 GADOBUTROL INJECTION: HCPCS

## 2025-08-21 RX ORDER — GADOBUTROL 604.72 MG/ML
6 INJECTION INTRAVENOUS
Status: COMPLETED | OUTPATIENT
Start: 2025-08-21 | End: 2025-08-21

## 2025-08-21 RX ADMIN — GADOBUTROL 6 ML: 604.72 INJECTION INTRAVENOUS at 13:38
